# Patient Record
Sex: FEMALE | Race: WHITE | HISPANIC OR LATINO | Employment: UNEMPLOYED | URBAN - METROPOLITAN AREA
[De-identification: names, ages, dates, MRNs, and addresses within clinical notes are randomized per-mention and may not be internally consistent; named-entity substitution may affect disease eponyms.]

---

## 2021-04-29 ENCOUNTER — APPOINTMENT (OUTPATIENT)
Dept: GENERAL RADIOLOGY | Facility: CLINIC | Age: 46
End: 2021-04-29
Attending: EMERGENCY MEDICINE
Payer: OTHER GOVERNMENT

## 2021-04-29 ENCOUNTER — HOSPITAL ENCOUNTER (EMERGENCY)
Facility: CLINIC | Age: 46
Discharge: HOME | End: 2021-04-29
Attending: EMERGENCY MEDICINE | Admitting: EMERGENCY MEDICINE
Payer: OTHER GOVERNMENT

## 2021-04-29 VITALS
HEART RATE: 91 BPM | WEIGHT: 140 LBS | OXYGEN SATURATION: 92 % | TEMPERATURE: 99.1 F | RESPIRATION RATE: 16 BRPM | DIASTOLIC BLOOD PRESSURE: 78 MMHG | SYSTOLIC BLOOD PRESSURE: 129 MMHG

## 2021-04-29 DIAGNOSIS — J12.82 PNEUMONIA DUE TO 2019 NOVEL CORONAVIRUS: ICD-10-CM

## 2021-04-29 DIAGNOSIS — U07.1 PNEUMONIA DUE TO 2019 NOVEL CORONAVIRUS: ICD-10-CM

## 2021-04-29 LAB
ANION GAP SERPL CALCULATED.3IONS-SCNC: 7 MMOL/L (ref 3–14)
BASOPHILS # BLD AUTO: 0 10E9/L (ref 0–0.2)
BASOPHILS NFR BLD AUTO: 0.2 %
BUN SERPL-MCNC: 5 MG/DL (ref 7–30)
CALCIUM SERPL-MCNC: 8.9 MG/DL (ref 8.5–10.1)
CHLORIDE SERPL-SCNC: 109 MMOL/L (ref 94–109)
CO2 SERPL-SCNC: 24 MMOL/L (ref 20–32)
CREAT SERPL-MCNC: 0.58 MG/DL (ref 0.52–1.04)
DIFFERENTIAL METHOD BLD: NORMAL
EOSINOPHIL # BLD AUTO: 0 10E9/L (ref 0–0.7)
EOSINOPHIL NFR BLD AUTO: 0.2 %
ERYTHROCYTE [DISTWIDTH] IN BLOOD BY AUTOMATED COUNT: 14.4 % (ref 10–15)
GFR SERPL CREATININE-BSD FRML MDRD: >90 ML/MIN/{1.73_M2}
GLUCOSE SERPL-MCNC: 150 MG/DL (ref 70–99)
HCT VFR BLD AUTO: 42.1 % (ref 35–47)
HGB BLD-MCNC: 13.5 G/DL (ref 11.7–15.7)
IMM GRANULOCYTES # BLD: 0 10E9/L (ref 0–0.4)
IMM GRANULOCYTES NFR BLD: 0.4 %
LYMPHOCYTES # BLD AUTO: 1.6 10E9/L (ref 0.8–5.3)
LYMPHOCYTES NFR BLD AUTO: 34.8 %
MCH RBC QN AUTO: 28.4 PG (ref 26.5–33)
MCHC RBC AUTO-ENTMCNC: 32.1 G/DL (ref 31.5–36.5)
MCV RBC AUTO: 89 FL (ref 78–100)
MONOCYTES # BLD AUTO: 0.3 10E9/L (ref 0–1.3)
MONOCYTES NFR BLD AUTO: 6 %
NEUTROPHILS # BLD AUTO: 2.6 10E9/L (ref 1.6–8.3)
NEUTROPHILS NFR BLD AUTO: 58.4 %
NRBC # BLD AUTO: 0 10*3/UL
NRBC BLD AUTO-RTO: 0 /100
PLATELET # BLD AUTO: 395 10E9/L (ref 150–450)
POTASSIUM SERPL-SCNC: 3.1 MMOL/L (ref 3.4–5.3)
RBC # BLD AUTO: 4.75 10E12/L (ref 3.8–5.2)
SODIUM SERPL-SCNC: 140 MMOL/L (ref 133–144)
WBC # BLD AUTO: 4.5 10E9/L (ref 4–11)

## 2021-04-29 PROCEDURE — 99285 EMERGENCY DEPT VISIT HI MDM: CPT | Mod: 25

## 2021-04-29 PROCEDURE — 85025 COMPLETE CBC W/AUTO DIFF WBC: CPT | Performed by: EMERGENCY MEDICINE

## 2021-04-29 PROCEDURE — 80048 BASIC METABOLIC PNL TOTAL CA: CPT | Performed by: EMERGENCY MEDICINE

## 2021-04-29 PROCEDURE — 93005 ELECTROCARDIOGRAM TRACING: CPT

## 2021-04-29 PROCEDURE — 71045 X-RAY EXAM CHEST 1 VIEW: CPT

## 2021-04-29 RX ORDER — BENZONATATE 200 MG/1
200 CAPSULE ORAL 3 TIMES DAILY PRN
Qty: 12 CAPSULE | Refills: 0 | Status: SHIPPED | OUTPATIENT
Start: 2021-04-29 | End: 2023-01-16

## 2021-04-29 ASSESSMENT — ENCOUNTER SYMPTOMS
VOMITING: 0
FEVER: 0
DIARRHEA: 0
SHORTNESS OF BREATH: 1
MYALGIAS: 1
COUGH: 1

## 2021-04-29 NOTE — ED PROVIDER NOTES
History   Chief Complaint:  Shortness of Breath       HPI   Brit Lerner is a 45 year old female who presents with shortness of breath. The patient complains of worsening shortness of breath on exertion for the last four days. She states that she had a positive COVID-19 test eight days ago (04/21) after developing myalgias and cough the day before. Denies any fever, vomiting, diarrhea , chest pain, or leg swelling.      Review of Systems   Constitutional: Negative for fever.   Respiratory: Positive for cough and shortness of breath.    Cardiovascular: Negative for chest pain and leg swelling.   Gastrointestinal: Negative for diarrhea and vomiting.   Musculoskeletal: Positive for myalgias.   All other systems reviewed and are negative.      Allergies:  No known drug allergies     Medications:  The patient is not currently taking any prescribed medications.     Past Medical History:    Limited history due to access to medical records     Past Surgical History:    Limited history due to access to medical records     Social History:  Presents to emergency department alone     Physical Exam     Patient Vitals for the past 24 hrs:   BP Temp Temp src Pulse Resp SpO2 Weight   04/29/21 1445 -- -- -- -- -- 92 % --   04/29/21 1430 129/78 -- -- 91 -- 93 % --   04/29/21 1415 -- -- -- -- -- 93 % --   04/29/21 1405 -- -- -- -- -- 93 % --   04/29/21 1404 -- -- -- -- -- 92 % --   04/29/21 1403 -- -- -- -- -- 92 % --   04/29/21 1402 -- -- -- -- -- 93 % --   04/29/21 1400 120/75 -- -- 100 -- 93 % --   04/29/21 1357 (!) 182/88 99.1  F (37.3  C) Oral 101 16 95 % 63.5 kg (140 lb)       Physical Exam  Constitutional: Alert, attentive  HENT:    Nose: Nose normal.    Mouth/Throat: Oropharynx is clear, mucous membranes are moist   Eyes: EOM are normal.   CV: regular rate and rhythm; no murmurs, rubs or gallups  Chest: Effort normal and breath sounds normal.   GI:  There is no tenderness. No distension. Normal bowel sounds  MSK: Normal  range of motion.   Neurological: Alert, attentive  Skin: Skin is warm and dry.      Emergency Department Course   ECG  ECG taken at 14:24:49, ECG read at 1424  Normal sinus rhythm. Possible inferior infarct, age undetermined. Abdominal ECG.    Rate 90 bpm. AZ interval 156 ms. QRS duration 96 ms. QT/QTc 356/435 ms. P-R-T axes 37 23 10.     Imaging:  XR Chest Port 1 View  IMPRESSION: Moderately extensive interstitial and airspace infiltrates  bilaterally.  As read by Radiology.     Laboratory:  CBC: WBC: 4.5, HGB: 13.5, PLT: 395  BMP: Glucose 150 (H), Potassium: 3.1 (L), Urea Nitrogen: 5 (L), o/w WNL (Creatinine: 0.58)     Emergency Department Course:    Reviewed:  I reviewed nursing notes and vitals    Assessments:  1412 I obtained history and examined the patient as noted above.   1501 I rechecked the patient and explained findings.     Disposition:  The patient was discharged to home.       Impression & Plan     Medical Decision Making:  Brit Sanchez is a 45 year old year old female who presents for evaluation of symptoms consistent with URI vs. influenza-like vs. COVID-19 illness and worsening dyspnea. COVID-19 testing  was positive earlier during the course of illness.  X-ray shows pneumonia consistent with COVID-19.  Fortunately, the patient is not hypoxic.  She is consistently 93% on room air without respiratory distress.  Plan home oxygen monitoring with home oximeter, educated to the natural course of the illness.  She will need to return immediately for worsening shortness of breath, persistent oxygen saturations less than 90%, or any other concerns.  She is well-hydrated well-appearing, and I believe is safe for discharge this time.        Covid-19  Brit Lerner was evaluated during a global COVID-19 pandemic, which necessitated consideration that the patient might be at risk for infection with the SARS-CoV-2 virus that causes COVID-19.   Applicable protocols for evaluation were followed  during the patient's care.   COVID-19 was considered as part of the patient's evaluation. The plan for testing is:  a test was obtained at a previous visit and reviewed & considered today.    Diagnosis:    ICD-10-CM    1. Pneumonia due to 2019 novel coronavirus  U07.1 COVID-19 [facility] Loop Referral    J12.82 Care Coordination Referral       Scribe Disclosure:  I, Chelsi Mendez, am serving as a scribe at 2:04 PM on 4/29/2021 to document services personally performed by Matthew Santiago MD based on my observations and the provider's statements to me.           Matthew Santiago MD  04/29/21 1640

## 2021-04-29 NOTE — ED TRIAGE NOTES
Patient arrived by EMS from home.  Patient diagnosed with COVID 8 days ago and became sob yesterday.  95% on RA.

## 2021-04-30 ENCOUNTER — PATIENT OUTREACH (OUTPATIENT)
Dept: CARE COORDINATION | Facility: CLINIC | Age: 46
End: 2021-04-30

## 2021-04-30 LAB — INTERPRETATION ECG - MUSE: NORMAL

## 2021-04-30 NOTE — PROGRESS NOTES
Clinic Care Coordination Contact    Care Coordination Hospital/ED Discharge Follow up Note    Hospital/ED Discharge date: 4/29/21    Reason/Diagnosis for Hospital/ED visit: COVID-19    Are you feeling better, the same, or worse since your Hospital/ED visit? Better- patient denies having shortness of breath this morning and feels her cough has improved since yesterday.     Were you sent home with oxygen or a pulse oximeter? No    Are you currently utilizing the pulse oximeter and/or oxygen? NA    Were you prescribed any new medications? Yes  -- tessalon capsule  If yes, have you picked up these new medications? Yes    Do you have any questions about the new medications? No    Have you had to reduce your activities because of shortness of breath or other symptoms? Yes: but patient reports it is improving       Follow up:    Do you have any follow up appointments scheduled with your PCP or a specialist? No    Do you feel like you have a plan in place in the event of an emergency? Yes    Confirmed patient has been sent .[email or web address] via [employer] or e-mail and reviewed any questions patient may have:  N/A- patient received within ED AVS      RN Notes:    Patient called with support from  through [employer]. She reports she is feeling better this morning, wouldn't elaborate with follow up questions asked through  but continued to state she did not have any questions.     Monique Darby, CORYN, RN   [facility]  - Clinic Care Coordinator

## 2021-12-28 RX ORDER — METOPROLOL TARTRATE 25 MG/1
25 TABLET, FILM COATED ORAL 2 TIMES DAILY
Qty: 60 TABLET | Refills: 11 | OUTPATIENT
Start: 2021-12-28

## 2021-12-28 NOTE — TELEPHONE ENCOUNTER
RE    Refill Request (Metoprolol Tartrate 25 MG Tablet)    Kirsten Atkins, Telma Pierre PA-C 3 hours ago (12:27 PM)     DB    Routing refill request to provider for review/approval because:     Patient never under Provider's care.   Med not active on med list.

## 2021-12-28 NOTE — TELEPHONE ENCOUNTER
Metoprolol tartrate (Lopressor) 25 mg tablet      Take 1 tablet by mouth 2 times daily (for high blood pressure)  Last Written Prescription Date:  n/a  Last Fill Quantity: n/a,   # refills: n/a  Last Office Visit : none  Future Office visit:  none    Routing refill request to provider for review/approval because:    Patient never under Provider's care.   Med not active on med list.

## 2023-01-16 ENCOUNTER — APPOINTMENT (OUTPATIENT)
Dept: GENERAL RADIOLOGY | Facility: CLINIC | Age: 48
End: 2023-01-16
Attending: EMERGENCY MEDICINE
Payer: MEDICAID

## 2023-01-16 ENCOUNTER — APPOINTMENT (OUTPATIENT)
Dept: CT IMAGING | Facility: CLINIC | Age: 48
End: 2023-01-16
Attending: EMERGENCY MEDICINE
Payer: MEDICAID

## 2023-01-16 ENCOUNTER — HOSPITAL ENCOUNTER (INPATIENT)
Facility: CLINIC | Age: 48
LOS: 2 days | Discharge: HOME | End: 2023-01-18
Attending: EMERGENCY MEDICINE | Admitting: INTERNAL MEDICINE
Payer: MEDICAID

## 2023-01-16 ENCOUNTER — APPOINTMENT (OUTPATIENT)
Dept: ULTRASOUND IMAGING | Facility: CLINIC | Age: 48
End: 2023-01-16
Attending: EMERGENCY MEDICINE
Payer: MEDICAID

## 2023-01-16 ENCOUNTER — APPOINTMENT (OUTPATIENT)
Dept: CARDIOLOGY | Facility: CLINIC | Age: 48
End: 2023-01-16
Attending: INTERNAL MEDICINE
Payer: MEDICAID

## 2023-01-16 DIAGNOSIS — I21.4 NSTEMI (NON-ST ELEVATED MYOCARDIAL INFARCTION) (H): ICD-10-CM

## 2023-01-16 DIAGNOSIS — R93.1 ABNORMAL ECHOCARDIOGRAM: ICD-10-CM

## 2023-01-16 DIAGNOSIS — Z65.9 OTHER SOCIAL STRESSOR: ICD-10-CM

## 2023-01-16 DIAGNOSIS — I51.81 STRESS-INDUCED CARDIOMYOPATHY: Primary | ICD-10-CM

## 2023-01-16 DIAGNOSIS — I51.9 LV DYSFUNCTION: ICD-10-CM

## 2023-01-16 LAB
ALBUMIN SERPL BCG-MCNC: 4.4 G/DL (ref 3.5–5.2)
ALP SERPL-CCNC: 112 U/L (ref 35–104)
ALT SERPL W P-5'-P-CCNC: 34 U/L (ref 10–35)
ANION GAP SERPL CALCULATED.3IONS-SCNC: 13 MMOL/L (ref 7–15)
AST SERPL W P-5'-P-CCNC: 25 U/L (ref 10–35)
ATRIAL RATE - MUSE: 74 BPM
BASOPHILS # BLD AUTO: 0 10E3/UL (ref 0–0.2)
BASOPHILS NFR BLD AUTO: 0 %
BILIRUB DIRECT SERPL-MCNC: <0.2 MG/DL (ref 0–0.3)
BILIRUB SERPL-MCNC: 0.3 MG/DL
BUN SERPL-MCNC: 7.4 MG/DL (ref 6–20)
CALCIUM SERPL-MCNC: 9 MG/DL (ref 8.6–10)
CHLORIDE SERPL-SCNC: 102 MMOL/L (ref 98–107)
CREAT SERPL-MCNC: 0.42 MG/DL (ref 0.51–0.95)
D DIMER PPP FEU-MCNC: 0.52 UG/ML FEU (ref 0–0.5)
DEPRECATED HCO3 PLAS-SCNC: 23 MMOL/L (ref 22–29)
DIASTOLIC BLOOD PRESSURE - MUSE: NORMAL MMHG
EOSINOPHIL # BLD AUTO: 0 10E3/UL (ref 0–0.7)
EOSINOPHIL NFR BLD AUTO: 1 %
ERYTHROCYTE [DISTWIDTH] IN BLOOD BY AUTOMATED COUNT: 13 % (ref 10–15)
GFR SERPL CREATININE-BSD FRML MDRD: >90 ML/MIN/1.73M2
GLUCOSE SERPL-MCNC: 133 MG/DL (ref 70–99)
HCT VFR BLD AUTO: 42.2 % (ref 35–47)
HGB BLD-MCNC: 13.7 G/DL (ref 11.7–15.7)
IMM GRANULOCYTES # BLD: 0 10E3/UL
IMM GRANULOCYTES NFR BLD: 0 %
INTERPRETATION ECG - MUSE: NORMAL
LIPASE SERPL-CCNC: 29 U/L (ref 13–60)
LVEF ECHO: NORMAL
LYMPHOCYTES # BLD AUTO: 1.4 10E3/UL (ref 0.8–5.3)
LYMPHOCYTES NFR BLD AUTO: 17 %
MCH RBC QN AUTO: 30 PG (ref 26.5–33)
MCHC RBC AUTO-ENTMCNC: 32.5 G/DL (ref 31.5–36.5)
MCV RBC AUTO: 93 FL (ref 78–100)
MONOCYTES # BLD AUTO: 0.3 10E3/UL (ref 0–1.3)
MONOCYTES NFR BLD AUTO: 4 %
NEUTROPHILS # BLD AUTO: 6.2 10E3/UL (ref 1.6–8.3)
NEUTROPHILS NFR BLD AUTO: 78 %
NRBC # BLD AUTO: 0 10E3/UL
NRBC BLD AUTO-RTO: 0 /100
P AXIS - MUSE: 55 DEGREES
PLATELET # BLD AUTO: 320 10E3/UL (ref 150–450)
POTASSIUM SERPL-SCNC: 3.8 MMOL/L (ref 3.4–5.3)
PR INTERVAL - MUSE: 164 MS
PROT SERPL-MCNC: 7.9 G/DL (ref 6.4–8.3)
QRS DURATION - MUSE: 92 MS
QT - MUSE: 408 MS
QTC - MUSE: 452 MS
R AXIS - MUSE: 80 DEGREES
RBC # BLD AUTO: 4.56 10E6/UL (ref 3.8–5.2)
SODIUM SERPL-SCNC: 138 MMOL/L (ref 136–145)
SYSTOLIC BLOOD PRESSURE - MUSE: NORMAL MMHG
T AXIS - MUSE: 64 DEGREES
TROPONIN T SERPL HS-MCNC: 34 NG/L
TROPONIN T SERPL HS-MCNC: 42 NG/L
TROPONIN T SERPL HS-MCNC: 51 NG/L
VENTRICULAR RATE- MUSE: 74 BPM
WBC # BLD AUTO: 8 10E3/UL (ref 4–11)

## 2023-01-16 PROCEDURE — 250N000011 HC RX IP 250 OP 636: Performed by: EMERGENCY MEDICINE

## 2023-01-16 PROCEDURE — 250N000013 HC RX MED GY IP 250 OP 250 PS 637: Performed by: EMERGENCY MEDICINE

## 2023-01-16 PROCEDURE — 93325 DOPPLER ECHO COLOR FLOW MAPG: CPT

## 2023-01-16 PROCEDURE — 93325 DOPPLER ECHO COLOR FLOW MAPG: CPT | Mod: 26 | Performed by: INTERNAL MEDICINE

## 2023-01-16 PROCEDURE — 71046 X-RAY EXAM CHEST 2 VIEWS: CPT

## 2023-01-16 PROCEDURE — 71275 CT ANGIOGRAPHY CHEST: CPT

## 2023-01-16 PROCEDURE — 85379 FIBRIN DEGRADATION QUANT: CPT | Performed by: EMERGENCY MEDICINE

## 2023-01-16 PROCEDURE — 255N000002 HC RX 255 OP 636: Performed by: INTERNAL MEDICINE

## 2023-01-16 PROCEDURE — 120N000001 HC R&B MED SURG/OB

## 2023-01-16 PROCEDURE — 93321 DOPPLER ECHO F-UP/LMTD STD: CPT | Mod: 26 | Performed by: INTERNAL MEDICINE

## 2023-01-16 PROCEDURE — 250N000011 HC RX IP 250 OP 636: Performed by: INTERNAL MEDICINE

## 2023-01-16 PROCEDURE — 99222 1ST HOSP IP/OBS MODERATE 55: CPT | Performed by: INTERNAL MEDICINE

## 2023-01-16 PROCEDURE — C8924 2D TTE W OR W/O FOL W/CON,FU: HCPCS

## 2023-01-16 PROCEDURE — 84484 ASSAY OF TROPONIN QUANT: CPT | Performed by: INTERNAL MEDICINE

## 2023-01-16 PROCEDURE — 99221 1ST HOSP IP/OBS SF/LOW 40: CPT | Performed by: SURGERY

## 2023-01-16 PROCEDURE — 83690 ASSAY OF LIPASE: CPT | Performed by: EMERGENCY MEDICINE

## 2023-01-16 PROCEDURE — 99223 1ST HOSP IP/OBS HIGH 75: CPT | Mod: AI | Performed by: INTERNAL MEDICINE

## 2023-01-16 PROCEDURE — 85025 COMPLETE CBC W/AUTO DIFF WBC: CPT | Performed by: EMERGENCY MEDICINE

## 2023-01-16 PROCEDURE — 99207 PR SC NO CHARGE VISIT: CPT | Performed by: INTERNAL MEDICINE

## 2023-01-16 PROCEDURE — 82248 BILIRUBIN DIRECT: CPT | Performed by: EMERGENCY MEDICINE

## 2023-01-16 PROCEDURE — 80048 BASIC METABOLIC PNL TOTAL CA: CPT | Performed by: NURSE PRACTITIONER

## 2023-01-16 PROCEDURE — 84484 ASSAY OF TROPONIN QUANT: CPT | Performed by: EMERGENCY MEDICINE

## 2023-01-16 PROCEDURE — 93005 ELECTROCARDIOGRAM TRACING: CPT

## 2023-01-16 PROCEDURE — 250N000013 HC RX MED GY IP 250 OP 250 PS 637: Performed by: INTERNAL MEDICINE

## 2023-01-16 PROCEDURE — 80053 COMPREHEN METABOLIC PANEL: CPT | Performed by: EMERGENCY MEDICINE

## 2023-01-16 PROCEDURE — 36415 COLL VENOUS BLD VENIPUNCTURE: CPT | Performed by: EMERGENCY MEDICINE

## 2023-01-16 PROCEDURE — 258N000003 HC RX IP 258 OP 636: Performed by: EMERGENCY MEDICINE

## 2023-01-16 PROCEDURE — 93308 TTE F-UP OR LMTD: CPT | Mod: 26 | Performed by: INTERNAL MEDICINE

## 2023-01-16 PROCEDURE — 76705 ECHO EXAM OF ABDOMEN: CPT

## 2023-01-16 PROCEDURE — 36415 COLL VENOUS BLD VENIPUNCTURE: CPT | Performed by: INTERNAL MEDICINE

## 2023-01-16 PROCEDURE — 84702 CHORIONIC GONADOTROPIN TEST: CPT | Performed by: INTERNAL MEDICINE

## 2023-01-16 PROCEDURE — 99285 EMERGENCY DEPT VISIT HI MDM: CPT | Mod: 25

## 2023-01-16 RX ORDER — HYDROMORPHONE HYDROCHLORIDE 1 MG/ML
0.3 INJECTION, SOLUTION INTRAMUSCULAR; INTRAVENOUS; SUBCUTANEOUS ONCE
Status: COMPLETED | OUTPATIENT
Start: 2023-01-16 | End: 2023-01-16

## 2023-01-16 RX ORDER — ACETAMINOPHEN 500 MG
1000 TABLET ORAL EVERY 8 HOURS
Status: DISCONTINUED | OUTPATIENT
Start: 2023-01-16 | End: 2023-01-18 | Stop reason: HOSPADM

## 2023-01-16 RX ORDER — POTASSIUM CHLORIDE 1500 MG/1
20 TABLET, EXTENDED RELEASE ORAL
Status: COMPLETED | OUTPATIENT
Start: 2023-01-16 | End: 2023-01-17

## 2023-01-16 RX ORDER — LIDOCAINE 40 MG/G
CREAM TOPICAL
Status: DISCONTINUED | OUTPATIENT
Start: 2023-01-16 | End: 2023-01-18 | Stop reason: HOSPADM

## 2023-01-16 RX ORDER — HEPARIN SODIUM 10000 [USP'U]/100ML
0-5000 INJECTION, SOLUTION INTRAVENOUS CONTINUOUS
Status: DISCONTINUED | OUTPATIENT
Start: 2023-01-16 | End: 2023-01-16

## 2023-01-16 RX ORDER — NICOTINE POLACRILEX 4 MG
15-30 LOZENGE BUCCAL
Status: DISCONTINUED | OUTPATIENT
Start: 2023-01-16 | End: 2023-01-18 | Stop reason: HOSPADM

## 2023-01-16 RX ORDER — ASPIRIN 81 MG/1
243 TABLET, CHEWABLE ORAL ONCE
Status: DISCONTINUED | OUTPATIENT
Start: 2023-01-16 | End: 2023-01-16

## 2023-01-16 RX ORDER — HYDROMORPHONE HYDROCHLORIDE 1 MG/ML
0.3 INJECTION, SOLUTION INTRAMUSCULAR; INTRAVENOUS; SUBCUTANEOUS
Status: DISCONTINUED | OUTPATIENT
Start: 2023-01-16 | End: 2023-01-18 | Stop reason: HOSPADM

## 2023-01-16 RX ORDER — IOPAMIDOL 755 MG/ML
500 INJECTION, SOLUTION INTRAVASCULAR ONCE
Status: COMPLETED | OUTPATIENT
Start: 2023-01-16 | End: 2023-01-16

## 2023-01-16 RX ORDER — DEXTROSE MONOHYDRATE 25 G/50ML
25-50 INJECTION, SOLUTION INTRAVENOUS
Status: DISCONTINUED | OUTPATIENT
Start: 2023-01-16 | End: 2023-01-18 | Stop reason: HOSPADM

## 2023-01-16 RX ORDER — ENOXAPARIN SODIUM 100 MG/ML
40 INJECTION SUBCUTANEOUS EVERY 24 HOURS
Status: DISCONTINUED | OUTPATIENT
Start: 2023-01-16 | End: 2023-01-18 | Stop reason: HOSPADM

## 2023-01-16 RX ORDER — ASPIRIN 325 MG
325 TABLET ORAL ONCE
Status: COMPLETED | OUTPATIENT
Start: 2023-01-16 | End: 2023-01-16

## 2023-01-16 RX ORDER — SODIUM CHLORIDE 9 MG/ML
INJECTION, SOLUTION INTRAVENOUS CONTINUOUS
Status: DISCONTINUED | OUTPATIENT
Start: 2023-01-16 | End: 2023-01-16

## 2023-01-16 RX ORDER — CARBOXYMETHYLCELLULOSE SODIUM 10 MG/ML
1 GEL OPHTHALMIC 4 TIMES DAILY
COMMUNITY
End: 2023-03-14

## 2023-01-16 RX ORDER — NITROGLYCERIN 0.4 MG/1
TABLET SUBLINGUAL
Status: DISCONTINUED
Start: 2023-01-16 | End: 2023-01-16 | Stop reason: HOSPADM

## 2023-01-16 RX ORDER — ASPIRIN 325 MG
325 TABLET ORAL ONCE
Status: DISCONTINUED | OUTPATIENT
Start: 2023-01-16 | End: 2023-01-16

## 2023-01-16 RX ORDER — LISINOPRIL AND HYDROCHLOROTHIAZIDE 12.5; 2 MG/1; MG/1
1 TABLET ORAL DAILY
Status: ON HOLD | COMMUNITY
End: 2023-01-18

## 2023-01-16 RX ADMIN — HUMAN ALBUMIN MICROSPHERES AND PERFLUTREN 3 ML: 10; .22 INJECTION, SOLUTION INTRAVENOUS at 13:40

## 2023-01-16 RX ADMIN — SODIUM CHLORIDE 85 ML: 9 INJECTION, SOLUTION INTRAVENOUS at 07:47

## 2023-01-16 RX ADMIN — ACETAMINOPHEN 1000 MG: 500 TABLET ORAL at 20:11

## 2023-01-16 RX ADMIN — HYDROMORPHONE HYDROCHLORIDE 0.3 MG: 1 INJECTION, SOLUTION INTRAMUSCULAR; INTRAVENOUS; SUBCUTANEOUS at 14:17

## 2023-01-16 RX ADMIN — ENOXAPARIN SODIUM 40 MG: 40 INJECTION SUBCUTANEOUS at 20:12

## 2023-01-16 RX ADMIN — ASPIRIN 325 MG ORAL TABLET 325 MG: 325 PILL ORAL at 06:37

## 2023-01-16 RX ADMIN — HEPARIN SODIUM 800 UNITS/HR: 10000 INJECTION, SOLUTION INTRAVENOUS at 07:23

## 2023-01-16 RX ADMIN — IOPAMIDOL 63 ML: 755 INJECTION, SOLUTION INTRAVENOUS at 07:47

## 2023-01-16 ASSESSMENT — ACTIVITIES OF DAILY LIVING (ADL)
ADLS_ACUITY_SCORE: 35
ADLS_ACUITY_SCORE: 18
ADLS_ACUITY_SCORE: 35
ADLS_ACUITY_SCORE: 18
ADLS_ACUITY_SCORE: 18
ADLS_ACUITY_SCORE: 35

## 2023-01-16 ASSESSMENT — ENCOUNTER SYMPTOMS
SHORTNESS OF BREATH: 0
FEVER: 0

## 2023-01-16 NOTE — ED NOTES
[facility]  ED Nurse Handoff Report    Brit Sanchez is a 47 year old female   ED Chief complaint: Chest Pain  . ED Diagnosis:   Final diagnoses:   NSTEMI (non-ST elevated myocardial infarction) (H)   Other social stressor     Allergies: No Known Allergies    Code Status: Full Code  Activity level - Baseline/Home:  Independent. Activity Level - Current:   Stand by Assist. Lift room needed: No. Bariatric: No   Needed: No   Isolation: No. Infection: Not Applicable.     Vital Signs:   Vitals:    01/16/23 0926 01/16/23 0941 01/16/23 0956 01/16/23 1011   BP: 120/77 116/76 126/78 121/79   Pulse: 66 65 70 63   Resp:       Temp:       TempSrc:       SpO2: 95% 96% 95% 96%   Weight:           Cardiac Rhythm:  ,      Pain level:    Patient confused: No. Patient Falls Risk: Yes.   Elimination Status: Has voided   Patient Report - Initial Complaint:  0512 ED Triage Notes Addendum ED Triage Notes Addendum CP          ?Pt arrives to ED with one day of R sided CP that goes from under her R breast and around to her back. Pt states this began around noon, tried ibu around 1300 without relief. Denies medical hx or trauma. Denies n/v/d.          Focused Assessment:    0711 Cardiac  Cardiac  KN    Cardiac (Adult) Cardiac WDL: .WDL except; chest pain   Chest Pain Assessment Chest Pain Location: anterior chest, right  Chest Pain Radiation: back  Precipitating Factors: emotional stress  Associated Signs/Symptoms: other (see comments)  (face/eye twitching)  Chest Pain Intervention: 12-lead ECG obtained; cardiac monitor placed                Tests Performed: labs, imaging, heparin   Abnormal Results:   US Abdomen Limited (RUQ)   Final Result   IMPRESSION: Cholelithiasis without cholecystitis.       RENEA YATES MD            SYSTEM ID:  [id number]      CT Chest Pulmonary Embolism w Contrast   Final Result   IMPRESSION:   1.  No pulmonary embolism demonstrated.   2.  Question some minimal groundglass  infiltrates in both lower lobes   vs. motion artifact.      RENAE YATES MD            SYSTEM ID:  [id number]      Chest XR,  PA & LAT   Final Result   IMPRESSION: Negative chest.      Echocardiogram Exercise Stress    (Results Pending)     Labs Ordered and Resulted from Time of ED Arrival to Time of ED Departure   BASIC METABOLIC PANEL - Abnormal       Result Value    Sodium 138      Potassium 3.8      Chloride 102      Carbon Dioxide (CO2) 23      Anion Gap 13      Urea Nitrogen 7.4      Creatinine 0.42 (*)     Calcium 9.0      Glucose 133 (*)     GFR Estimate >90     TROPONIN T, HIGH SENSITIVITY - Abnormal    Troponin T, High Sensitivity 51 (*)    HEPATIC FUNCTION PANEL - Abnormal    Protein Total 7.9      Albumin 4.4      Bilirubin Total 0.3      Alkaline Phosphatase 112 (*)     AST 25      ALT 34      Bilirubin Direct <0.20     D DIMER QUANTITATIVE - Abnormal    D-Dimer Quantitative 0.52 (*)    TROPONIN T, HIGH SENSITIVITY - Abnormal    Troponin T, High Sensitivity 42 (*)    LIPASE - Normal    Lipase 29     CBC WITH PLATELETS AND DIFFERENTIAL    WBC Count 8.0      RBC Count 4.56      Hemoglobin 13.7      Hematocrit 42.2      MCV 93      MCH 30.0      MCHC 32.5      RDW 13.0      Platelet Count 320      % Neutrophils 78      % Lymphocytes 17      % Monocytes 4      % Eosinophils 1      % Basophils 0      % Immature Granulocytes 0      NRBCs per 100 WBC 0      Absolute Neutrophils 6.2      Absolute Lymphocytes 1.4      Absolute Monocytes 0.3      Absolute Eosinophils 0.0      Absolute Basophils 0.0      Absolute Immature Granulocytes 0.0      Absolute NRBCs 0.0       Treatments provided: heparin, aspirin  Family Comments:  present  OBS brochure/video discussed/provided to patient:  N/A  ED Medications:   Medications   aspirin (ASA) tablet 325 mg (325 mg Oral Given 1/16/23 0637)   heparin loading dose for LOW INTENSITY TREATMENT * Give BEFORE starting heparin infusion (4,000 Units Intravenous Given 1/16/23  0723)   0.9% sodium chloride BOLUS (85 mLs Intravenous New Bag 1/16/23 0747)   iopamidol (ISOVUE-370) solution 500 mL (63 mLs Intravenous Given 1/16/23 0747)     Drips infusing:  No  For the majority of the shift, the patient's behavior Green. Interventions performed were N/A.    Sepsis treatment initiated: No     Patient tested for COVID 19 prior to admission: NO    ED Nurse Name/Phone Number: Katey Chen RN,   10:58 AM    RECEIVING UNIT ED HANDOFF REVIEW    Above ED Nurse Handoff Report was reviewed: Yes  Reviewed by: Nakita Espinoza, JONAS on January 16, 2023 at 4:40 PM

## 2023-01-16 NOTE — PROGRESS NOTES
[facility]  Hospitalist Progress Note  Dewayne Ricardo M.D., M.B.A.   01/16/2023    Reason for Stay/active problem list      Severe right-sided chest pain    Elevated troponin         Assessment and Plan:        Summary of Stay: Brit a 47-year-old female who presents with right-sided chest pain radiating to her right shoulder.  She had elevated troponin as well and started on a heparin drip due to concern for non-ST segment elevation MI.  Patient was closely monitored in the ER and treated with pain medications.  Cardiology team was consulted to assist with further recommendations.    Problem List with Assessment and Plan:      1. Right-sided chest pain: Atypical chest pain for acute cardiac ischemia.  EKG showed no evidence of ischemic EKG pattern.  Patient was empirically treated with heparin infusion in the emergency department.    Etiology of her chest pain is not entirely clear.  Differential diagnosis include biliary pain versus musculoskeletal versus others.    Cardiology was consulted and recommendation obtained.    Patient was sent for stress testing with a treadmill stress echo.  Unfortunately patient developed severe pain and resting wall motion abnormality on echo limiting the study.    Currently complaining of 9 out of 10 right-sided chest pain.  Will treat with Dilaudid IV 0.3 mg every 2 hours as needed.  We will get further recommendations from cardiology.  We will request some respiratory consultation for evaluation of possible biliary pain      2.  Elevated  troponin:      High-sensitivity troponin T was elevated at 51 on presentation.  Trended down to 42 after 4 hours.    We will get another troponin level in a few hours, monitor on telemetry, cardiology following.        3. Cholelithiasis      Evidence of cholelithiasis and ultrasonographic exam without evidence of acute cholecystitis.    We will get general surgery consult to assist with further recommendations and x-rays  if pain is related to gallbladder disease.        Plan for today:    Pain control    Further cardiology input    General surgery consult    Telemetry monitoring    Addendum  -- Patient was seen by cardiology team again and recommendation noted.  Coronary angiogram planned for tomorrow.  Cardiology input appreciated.    VTE Prophylaxis: Enoxaparin (Lovenox) SQ  Code Status: Full Code  Diet: NPO per Anesthesia Guidelines for Procedure/Surgery Except for: Meds    Villegas Catheter: Not present    Family updated today: Yes      Disposition: May discharge in the next 2 days pending resolution of chest pain          Interval History (Subjective):        Patient is seen and examined by me today and medical record reviewed.Overnight events noted and care discussed with nursing staff.  Patient is Telugu only speaking and iPad  was used for the encounter.  Patient continues to complain of right-sided chest pain which is not related to her breathing.  Her pain is moderate to severe radiating to the right shoulder area.  She denies any fever or chills.  No shortness of breath.                  Physical Exam:        Last Vital Signs:  /79   Pulse 63   Temp 97.3  F (36.3  C)   Resp 21   Wt 66.9 kg (147 lb 6.4 oz)   SpO2 96%     No intake/output data recorded.    Wt Readings from Last 5 Encounters:   01/16/23 66.9 kg (147 lb 6.4 oz)   04/29/21 63.5 kg (140 lb)        Constitutional: Awake, alert, cooperative, no apparent distress     Respiratory: Clear to auscultation bilaterally, no crackles or wheezing   Cardiovascular: Regular rate and rhythm, normal S1 and S2, and no murmur noted   Abdomen: Normal bowel sounds, soft, non-distended, non-tender   Skin: No new rashes, no cyanosis, dry to touch   Neuro: Alert with  no new focal weakness   Extremities: No edema   Other(s):        All other systems: Negative          Medications:        All current medications were reviewed with changes reflected in problem  list.         Data:      All new lab and imaging data was reviewed.      Data reviewed today: I reviewed all new labs and imaging results over the last 24 hours. I personally reviewed       Recent Labs   Lab 01/16/23  0532   WBC 8.0   HGB 13.7   HCT 42.2   MCV 93        No results for input(s): CULT in the last 168 hours.  Recent Labs   Lab 01/16/23  0532      POTASSIUM 3.8   CHLORIDE 102   CO2 23   ANIONGAP 13   *   BUN 7.4   CR 0.42*   GFRESTIMATED >90   ANA 9.0   PROTTOTAL 7.9   ALBUMIN 4.4   BILITOTAL 0.3   ALKPHOS 112*   AST 25   ALT 34       Recent Labs   Lab 01/16/23  0532   *       No results for input(s): INR in the last 168 hours.      No results for input(s): TROPONIN, TROPI, TROPR in the last 168 hours.    Invalid input(s): TROP, TROPONINIES    Recent Results (from the past 48 hour(s))   Chest XR,  PA & LAT    Narrative    EXAM: XR CHEST 2 VIEWS  LOCATION: [facility]  DATE/TIME: 1/16/2023 6:26 AM    INDICATION: chest pain  COMPARISON: Portable chest radiograph 04/29/2021      Impression    IMPRESSION: Negative chest.   CT Chest Pulmonary Embolism w Contrast    Narrative    CT CHEST PULMONARY EMBOLISM WITH CONTRAST 1/16/2023 8:02 AM    CLINICAL HISTORY: Chest pain.  Not pregnant.  No imaging to rule out  PE in the last 24 hours. Pulmonary Embolism Rule-Out Criteria (PERC)  score not > 0.    TECHNIQUE: CT angiogram chest during arterial phase injection IV  contrast. 2D and 3D MIP reconstructions were performed by the CT  technologist. Dose reduction techniques were used.     CONTRAST: 63 mL Isovue-370    COMPARISON: None.    FINDINGS:  ANGIOGRAM CHEST: Pulmonary arteries are normal caliber and negative  for pulmonary emboli. Thoracic aorta is negative for dissection. No CT  evidence of right heart strain.    LUNGS AND PLEURA: Question some groundglass infiltrates in both lower  lobes vs. motion artifact. No effusions.    MEDIASTINUM/AXILLAE: No adenopathy  or aneurysm.    CORONARY ARTERY CALCIFICATIONS: None.    UPPER ABDOMEN: No acute findings.    MUSCULOSKELETAL: No frankly destructive bony lesions.      Impression    IMPRESSION:  1.  No pulmonary embolism demonstrated.  2.  Question some minimal groundglass infiltrates in both lower lobes  vs. motion artifact.    RENAE YATES MD         SYSTEM ID:  [id number]   US Abdomen Limited (RUQ)    Narrative    US ABDOMEN LIMITED 2023 8:59 AM    CLINICAL HISTORY: Abdominal pain.  TECHNIQUE: Limited abdominal ultrasound.    COMPARISON: None.    FINDINGS:    GALLBLADDER: Cholelithiasis without cholecystitis.    BILE DUCTS: There is no biliary dilatation. The common duct measures 3  mm.    LIVER: Mild increased echotexture likely related to mild fatty  infiltration, no focal lesions.    RIGHT KIDNEY: No hydronephrosis.    PANCREAS: The visualized portions of the pancreas are normal.    No ascites.      Impression    IMPRESSION: Cholelithiasis without cholecystitis.     RENAE YATES MD         SYSTEM ID:  [id number]   Echo Limited   Result Value    LVEF  45-50%    Narrative    [id number]  [id number]  [id number]  [id number]^VINICIO^WHITNEY^MARYURI SANCHEZ     [facility]  Echocardiography Laboratory  [address] [place] [address]     Name: BLANKA ROB  MRN: [id number]  : 1975  Study Date: 2023 01:13 PM  Age: 47 yrs  Gender: Female  Patient Location: [facility]  Reason For Study: Chest Pain  Ordering Physician: WHITNEY MOONEY  Performed By: Calista Latif     BSA: 1.6 m2  Height: 60 in  Weight: 147 lb  BP: 110/80 mmHg  ______________________________________________________________________________  Procedure  Limited Echo Adult. Optison (NDC #0695-1269) given intravenously.  ______________________________________________________________________________  Interpretation Summary     A limited study was performed  There is lateral wall dyskinesis.  Left ventricular systolic function is mildly reduced.  The  "right ventricle is normal in structure, function and size.  Doppler interrogation does not demonstrate signficant stenosis or  insufficiency involving cardiac valves.     There is a focal anterolateral region of dyskinesis. The pattern of wall  motion abnormality is not typical for myocardial ischemia as the apex is  spared. The differential diagnosis includes \"Takotsubo/Stress\" cardiomyopathy  or lateral wall ischemic event from ramus/ diagonal coronary artery disease.  ______________________________________________________________________________  Left Ventricle  The left ventricle is normal in size. There is normal left ventricular wall  thickness. Left ventricular systolic function is mildly reduced. The visual  ejection fraction is 45-50%. Left ventricular diastolic function is  indeterminate. There is lateral wall dyskinesis. There is no thrombus seen in  the left ventricle.     Right Ventricle  The right ventricle is normal in structure, function and size. There is no  mass or thrombus in the right ventricle.     Atria  Normal left atrial size. The left atrial appendage is not well visualized.     Mitral Valve  The mitral valve leaflets appear normal. There is no evidence of stenosis,  fluttering, or prolapse. There is no mitral regurgitation noted. There is no  mitral valve stenosis.     Tricuspid Valve  Normal tricuspid valve. No tricuspid regurgitation. There is no tricuspid  stenosis.     Aortic Valve  The aortic valve is trileaflet. No aortic regurgitation is present. No aortic  stenosis is present.     Pulmonic Valve  The pulmonic valve is not well seen, but is grossly normal. There is no  pulmonic valvular regurgitation. There is no pulmonic valvular stenosis.     Vessels  The aortic root is normal size. Normal size ascending aorta. Inferior vena  cava not well visualized for estimation of right atrial pressure.     Pericardium  The pericardium appears normal. There is no pleural effusion.   "   Rhythm  Sinus rhythm was noted.  ______________________________________________________________________________  MMode/2D Measurements & Calculations  asc Aorta Diam: 3.5 cm     ______________________________________________________________________________  Report approved by: Dr. Sandro Blackburn 01/16/2023 02:08 PM             COVID Status:  COVID-19 PCR Results    COVID-19 PCR Results 1/7/22 10/5/22   COVID-19 Virus by PCR (External Result) Not Detected Not Detected      Comments are available for some flowsheets but are not being displayed.         COVID-19 Antibody Results, Testing for Immunity    COVID-19 Antibody Results, Testing for Immunity   No data to display.              Disclaimer: This note consists of symbols derived from keyboarding, dictation and/or voice recognition software. As a result, there may be errors in the script that have gone undetected. Please consider this when interpreting information found in this chart.

## 2023-01-16 NOTE — ED PROVIDER NOTES
History     Chief Complaint:  Chest Pain     The history is provided by the patient. A  was used (Dominican).      Brit Sanchez is a 47 year old female who presents with right sided chest pain. The patient provides that yesterday around 1200, she developed a right sided chest pain just inferior to her breast and around her back. This pain has been constant since then. She denies having any shortness of breath or fever with this. No cardiac history and she has no daily medications. It was noted that she recently found out a family member passed away.    Independent Historian & Review of External Notes: I reviewed with: Care Everywhere.     ROS:  Review of Systems   Constitutional: Negative for fever.   Respiratory: Negative for shortness of breath.    Cardiovascular: Positive for chest pain (right).   All other systems reviewed and are negative.    Allergies:  The patient has no known allergies to medications.     Medications:    lisinopril-hydrochlorothiazide (ZESTORETIC) 20-12.5 MG tablet      Past Medical History:   Diagnosis Date     Hypertension      Kidney stone      Past Surgical History:   Procedure Laterality Date     HYSTEROSCOPY DIAGNOSTIC       Tubal ligation NOS          Family History:    The patient denies any prior family history.    Social History:  Patient came from home.  Patient is accompanied in the ED.  PCP: [facility]     Physical Exam     Patient Vitals for the past 24 hrs:   BP Temp Temp src Pulse Resp SpO2 Weight   01/16/23 0643 -- -- -- -- -- -- 66.9 kg (147 lb 6.4 oz)   01/16/23 0600 -- -- -- 64 16 96 % --   01/16/23 0545 -- -- -- 71 13 97 % --   01/16/23 0512 137/76 97.3  F (36.3  C) -- -- -- -- --   01/16/23 0511 -- -- Temporal 67 20 97 % --        Physical Exam  Nursing note and vitals reviewed.  Constitutional: Cooperative. Tearful.   HENT:   Mouth/Throat: Mucous membranes are normal.   Cardiovascular: Normal rate, regular  rhythm and normal heart sounds.  No murmur.  Pulmonary/Chest: Effort normal and breath sounds normal. No respiratory distress. No wheezes. No rales.   Abdominal: Soft. Normal appearance. There is no tenderness.    Musculoskeletal: No LE edema  Neurological: Alert. Oriented x4  Skin: Skin is warm and dry.   Psychiatric: Normal mood and affect.     Emergency Department Course     ECG  ECG taken at 0520, ECG read at 0522  Normal sinus rhythm with sinus arrhythmia  Normal ECG   Rate 74 bpm. AR interval 164 ms. QRS duration 92 ms. QT/QTc 408/452 ms. P-R-T axes 55 80 64.     Imaging:  Chest XR,  PA & LAT   Final Result   IMPRESSION: Negative chest.      Report per radiology    Laboratory:  Labs Ordered and Resulted from Time of ED Arrival to Time of ED Departure   BASIC METABOLIC PANEL - Abnormal       Result Value    Sodium 138      Potassium 3.8      Chloride 102      Carbon Dioxide (CO2) 23      Anion Gap 13      Urea Nitrogen 7.4      Creatinine 0.42 (*)     Calcium 9.0      Glucose 133 (*)     GFR Estimate >90     TROPONIN T, HIGH SENSITIVITY - Abnormal    Troponin T, High Sensitivity 51 (*)    HEPATIC FUNCTION PANEL - Abnormal    Protein Total 7.9      Albumin 4.4      Bilirubin Total 0.3      Alkaline Phosphatase 112 (*)     AST 25      ALT 34      Bilirubin Direct <0.20     LIPASE - Normal    Lipase 29     CBC WITH PLATELETS AND DIFFERENTIAL    WBC Count 8.0      RBC Count 4.56      Hemoglobin 13.7      Hematocrit 42.2      MCV 93      MCH 30.0      MCHC 32.5      RDW 13.0      Platelet Count 320      % Neutrophils 78      % Lymphocytes 17      % Monocytes 4      % Eosinophils 1      % Basophils 0      % Immature Granulocytes 0      NRBCs per 100 WBC 0      Absolute Neutrophils 6.2      Absolute Lymphocytes 1.4      Absolute Monocytes 0.3      Absolute Eosinophils 0.0      Absolute Basophils 0.0      Absolute Immature Granulocytes 0.0      Absolute NRBCs 0.0     D DIMER QUANTITATIVE - pending          Interventions:  Medications   aspirin (ASA) tablet 325 mg (has no administration in time range)   heparin loading dose for LOW INTENSITY TREATMENT * Give BEFORE starting heparin infusion (has no administration in time range)   heparin 25,000 units in 0.45% NaCl 250 mL ANTICOAGULANT infusion (has no administration in time range)      Independent Interpretation (X-rays, CTs, rhythm strip):  I independently reviewed ECG and xray.    Consultations/Discussion of Management or Tests:  0533 I obtained history and examined the patient as noted above.  0614 I rechecked the patient and explained findings.  0630 I consulted with Dr. Buck of the hospitalist service and discussed patient admission. They accepted care of the patient.    Disposition:  The patient was admitted to the hospital under the care of Dr. Buck.     Impression & Plan      Medical Decision Making:  Brit Sanchez is a 47 year old female who undergoing significant stress following the loss of a family member who presents with right sided chest pain since yesterday. ECG is nonischemic but unfortunately her troponin is elevated, concerning for a NSTEMI. This could be a case of stress cardiomyopathy. No indication for cath lab activation at this time. We will start heparin and administer aspirin PO. I will add a dimer to her labs to insure there is no evidence of PE, although I clinically doubt this. Her abdominal exam is normal with no tenderness to the RUQ. She will be admitted for medical management, serial troponins, echocardiogram, and consideration of cardiology consultation.    Diagnosis:    ICD-10-CM    1. NSTEMI (non-ST elevated myocardial infarction) (H)  I21.4       2. Other social stressor  Z65.9            Scribe Disclosure:  I, Pablo Kumari, am serving as a scribe at 5:29 AM on 1/16/2023 to document services personally performed by Matthew Kerr MD based on my observations and the provider's statements to me.        Matthew Kerr,  MD  01/16/23 0702

## 2023-01-16 NOTE — PHARMACY-ADMISSION MEDICATION HISTORY
Admission medication history interview status for this patient is complete. See [facility] admission navigator for allergy information, prior to admission medications and immunization status.     Medication history interview done, indicate source(s): Patient and surescripts,   Medication history resources (including written lists, pill bottles, clinic record):surescripts, pt  Pharmacy: TBD    Changes made to PTA medication list:  Added: eye drops  Changed: none  Reported as Not Taking: none  Removed: none    Actions taken by pharmacist (provider contacted, etc):spoke to the pt via      Additional medication history information:pt reports to have run out of BP meds 3 days or so ago. She mentioned that she requested it from the pharmacy but haven't heard back yet. Pt also reports using drops for dry eyes. No other meds or supplements are reported at this time.    Medication reconciliation/reorder completed by provider prior to medication history?  NO      Prior to Admission medications    Medication Sig Last Dose Taking? Auth Provider Long Term End Date   carboxymethylcellulose PF (REFRESH LIQUIGEL) 1 % ophthalmic gel Place 1 drop into both eyes 4 times daily Past Week at na Yes Unknown, Entered By History     lisinopril-hydrochlorothiazide (ZESTORETIC) 20-12.5 MG tablet Take 1 tablet by mouth daily Past Week at na Yes Reported, Patient Yes      \

## 2023-01-16 NOTE — PROGRESS NOTES
Patient seen and examined by me today .Medical records reviewed and plan of care from the previous attending physician  earlier today was discussed with the patient.  I also spoke with Dr Buck.  Briefly patient is 46 y/o Slovak only speaking female who came in with atypical chest pain.    Assessment     Chest pain , atypical right sided , no indication of cardiac ischemia despite trop bump. Seen by cardiology and input appreciated     Elevated troponin - trending down    Cholelithiasis     Plan     Stop heparin     Stress echo     May need lap ximena if pain persists

## 2023-01-16 NOTE — CONSULTS
General Surgery Consultation    Brit Sanchez MRN# [id number]   Age: 47 year old YOB: 1975     Date of Admission:  1/16/2023    Reason for consult:            Right sided pain       Requesting physician:            MD Davion                Assessment and Plan:   Assessment:   Brit Sanchez is a 47 year old female with right sided pain and found cholelithiasis, cardiac workup with elevated troponin, reduced LV systolic function, and concern for cardiomyopathy vs lateral wall ischemia.     Comorbidities:   has a past medical history of Hypertension and Kidney stone.      Plan:   I spoke with Dr. Ricardo with the hospitalist service. Although patient has cholelithiasis on US, other US findings are not consistent with acute gallbladder issue. Patient physical exam with pain on the right lateral chest wall and no pain under the right rib cage and negative Way sign would support that the pain is not gallbladder related.  At this time with current cardiac workup going on I would not recommend cholecystectomy. Surgery to sign off please call with questions/concerns.                  Chief Complaint:   Right sided pain     History is obtained from the patient using the assistance of a Albanian speaking .         History of Present Illness:   Brit Sanchez is a 47 year old female with pain on her right mediolateral chest under the right breast.  The pain is not described as in her abdomen. She has had nausea. She states the pain is worse with deep breathing. No fever.              Past Medical History:     Past Medical History:   Diagnosis Date     Hypertension      Kidney stone              Past Surgical History:     Past Surgical History:   Procedure Laterality Date     HYSTEROSCOPY DIAGNOSTIC       Tubal ligation NOS               Social History:     Social History     Tobacco Use     Smoking status: Not on file     Smokeless tobacco: Not on file   Substance Use  Topics     Alcohol use: Not on file             Family History:   No family history of bleeding or clotting disorders.         Allergies:   No Known Allergies          Medications:   No current facility-administered medications on file prior to encounter.  carboxymethylcellulose PF (REFRESH LIQUIGEL) 1 % ophthalmic gel, Place 1 drop into both eyes 4 times daily  lisinopril-hydrochlorothiazide (ZESTORETIC) 20-12.5 MG tablet, Take 1 tablet by mouth daily        acetaminophen  1,000 mg Oral Q8H     enoxaparin ANTICOAGULANT  40 mg Subcutaneous Q24H     sodium chloride (PF)  3 mL Intracatheter Q8H            Review of Systems:   The 10 point review of systems is negative other than noted in the HPI.          Physical Exam:   /79   Pulse 63   Temp 97.3  F (36.3  C)   Resp 21   Wt 66.9 kg (147 lb 6.4 oz)   SpO2 96%   General - Well developed, well nourished female in no apparent distress  HEENT:  Head normocephalic and atraumatic, pupils equal and round, conjunctivae clear, no scleral icterus, mucous membranes moist, external ears and nose normal  Neck: Supple without thyromegaly or masses  Lymphatic: No cervical, or supraclavicular lymphadenopathy  Lungs: Clear to auscultation bilaterally  Heart: RR  Chest: pain to medio-lateral chest  Abdomen: soft, non-tender, no chu sign, no tenderness with deep palpation under right rib edge  Extremities: Warm without edema  Neurologic: nonfocal  Psychiatric: Mood and affect appropriate  Skin: Without lesions, rashes, or jaundice         Data:     WBC -   Lab Results   Component Value Date    WBC 8.0 01/16/2023       HgB -   Lab Results   Component Value Date    HGB 13.7 01/16/2023       Plt-   Lab Results   Component Value Date     01/16/2023       Liver Function Studies -   Recent Labs   Lab Test 01/16/23  0532   PROTTOTAL 7.9   ALBUMIN 4.4   BILITOTAL 0.3   ALKPHOS 112*   AST 25   ALT 34       Lipase-   Lab Results   Component Value Date    LIPASE 29 01/16/2023          Imaging:  All imaging studies reviewed by me.    Results for orders placed or performed during the hospital encounter of 01/16/23   Chest XR,  PA & LAT    Narrative    EXAM: XR CHEST 2 VIEWS  LOCATION: [facility]  DATE/TIME: 1/16/2023 6:26 AM    INDICATION: chest pain  COMPARISON: Portable chest radiograph 04/29/2021      Impression    IMPRESSION: Negative chest.   CT Chest Pulmonary Embolism w Contrast    Narrative    CT CHEST PULMONARY EMBOLISM WITH CONTRAST 1/16/2023 8:02 AM    CLINICAL HISTORY: Chest pain.  Not pregnant.  No imaging to rule out  PE in the last 24 hours. Pulmonary Embolism Rule-Out Criteria (PERC)  score not > 0.    TECHNIQUE: CT angiogram chest during arterial phase injection IV  contrast. 2D and 3D MIP reconstructions were performed by the CT  technologist. Dose reduction techniques were used.     CONTRAST: 63 mL Isovue-370    COMPARISON: None.    FINDINGS:  ANGIOGRAM CHEST: Pulmonary arteries are normal caliber and negative  for pulmonary emboli. Thoracic aorta is negative for dissection. No CT  evidence of right heart strain.    LUNGS AND PLEURA: Question some groundglass infiltrates in both lower  lobes vs. motion artifact. No effusions.    MEDIASTINUM/AXILLAE: No adenopathy or aneurysm.    CORONARY ARTERY CALCIFICATIONS: None.    UPPER ABDOMEN: No acute findings.    MUSCULOSKELETAL: No frankly destructive bony lesions.      Impression    IMPRESSION:  1.  No pulmonary embolism demonstrated.  2.  Question some minimal groundglass infiltrates in both lower lobes  vs. motion artifact.    RENAE YATES MD         SYSTEM ID:  [id number]   US Abdomen Limited (RUQ)    Narrative    US ABDOMEN LIMITED 1/16/2023 8:59 AM    CLINICAL HISTORY: Abdominal pain.  TECHNIQUE: Limited abdominal ultrasound.    COMPARISON: None.    FINDINGS:    GALLBLADDER: Cholelithiasis without cholecystitis.    BILE DUCTS: There is no biliary dilatation. The common duct measures 3  mm.    LIVER:  "Mild increased echotexture likely related to mild fatty  infiltration, no focal lesions.    RIGHT KIDNEY: No hydronephrosis.    PANCREAS: The visualized portions of the pancreas are normal.    No ascites.      Impression    IMPRESSION: Cholelithiasis without cholecystitis.     RENAE YATES MD         SYSTEM ID:  [id number]   Echo Limited     Value    LVEF  45-50%    Narrative    [id number]  [id number]  [id number]  [id number]^VINICIO^WIHTNEY^MARYURI SANCHEZ     [facility]  Echocardiography Laboratory  [address] [place] [address]     Name: BLANKA ROB  MRN: [id number]  : 1975  Study Date: 2023 01:13 PM  Age: 47 yrs  Gender: Female  Patient Location: [facility]  Reason For Study: Chest Pain  Ordering Physician: WHITNEY MOONEY  Performed By: Calista Latif     BSA: 1.6 m2  Height: 60 in  Weight: 147 lb  BP: 110/80 mmHg  ______________________________________________________________________________  Procedure  Limited Echo Adult. Optison (NDC #2215-7515) given intravenously.  ______________________________________________________________________________  Interpretation Summary     A limited study was performed  There is lateral wall dyskinesis.  Left ventricular systolic function is mildly reduced.  The right ventricle is normal in structure, function and size.  Doppler interrogation does not demonstrate signficant stenosis or  insufficiency involving cardiac valves.     There is a focal anterolateral region of dyskinesis. The pattern of wall  motion abnormality is not typical for myocardial ischemia as the apex is  spared. The differential diagnosis includes \"Takotsubo/Stress\" cardiomyopathy  or lateral wall ischemic event from ramus/ diagonal coronary artery disease.  ______________________________________________________________________________  Left Ventricle  The left ventricle is normal in size. There is normal left ventricular wall  thickness. Left ventricular systolic function is " mildly reduced. The visual  ejection fraction is 45-50%. Left ventricular diastolic function is  indeterminate. There is lateral wall dyskinesis. There is no thrombus seen in  the left ventricle.     Right Ventricle  The right ventricle is normal in structure, function and size. There is no  mass or thrombus in the right ventricle.     Atria  Normal left atrial size. The left atrial appendage is not well visualized.     Mitral Valve  The mitral valve leaflets appear normal. There is no evidence of stenosis,  fluttering, or prolapse. There is no mitral regurgitation noted. There is no  mitral valve stenosis.     Tricuspid Valve  Normal tricuspid valve. No tricuspid regurgitation. There is no tricuspid  stenosis.     Aortic Valve  The aortic valve is trileaflet. No aortic regurgitation is present. No aortic  stenosis is present.     Pulmonic Valve  The pulmonic valve is not well seen, but is grossly normal. There is no  pulmonic valvular regurgitation. There is no pulmonic valvular stenosis.     Vessels  The aortic root is normal size. Normal size ascending aorta. Inferior vena  cava not well visualized for estimation of right atrial pressure.     Pericardium  The pericardium appears normal. There is no pleural effusion.     Rhythm  Sinus rhythm was noted.  ______________________________________________________________________________  MMode/2D Measurements & Calculations  asc Aorta Diam: 3.5 cm     ______________________________________________________________________________  Report approved by: Dr. Sandro Blackburn 01/16/2023 02:08 PM               Time spent with the patient, reviewing the EMR, reviewing laboratory and imaging studies, more than 50% of which was counseling and coordinating care:  48 minutes.     Jose Armenta MD

## 2023-01-16 NOTE — PROGRESS NOTES
Patient was in cardiopulmonary for a treadmill stress echo.  Due to patients 9/10 chest pain and resting wall motion abnormality on echo the treadmill was not done, and only a limited echo was performed.

## 2023-01-16 NOTE — PROGRESS NOTES
Brief Cardiology Progress Note:  Discussed case with Dr. Carpio. Echocardiogram today showing mildly reduced LV systolic function with EF 45-50% and a focal anterolateral region of dyskinesis suspicious for possible Takotsubo/stress cardiomyopathy versus possible lateral wall ischemia in the ramus/diagonal coronary territory.     Spoke with patient with professional  connected via iPad to update her on the echocardiogram findings and recommendation for coronary angiogram and possible PCI tomorrow for further work-up and help exclude the possibility of ischemia. Risks and benefits of coronary angiogram discussed today including, bleeding, bruising, infection, allergic reaction, kidney damage (including need for dialysis), stroke, heart attack, vascular damage, emergency open heart surgery, up to and including death. Patient indicates understanding and is agreeable to proceed. She has no known history of an allergy to contrast dye.    Please keep n.p.o. after midnight tonight for the angiogram tomorrow.    DANIEL Stone, CNP   Nurse Practitioner  [facility] [place]  Pager: [phone]  Text Page  (8am - 5pm, M-F)

## 2023-01-16 NOTE — CONSULTS
Consult Date: 01/16/2023    REASON FOR CONSULTATION:  Possible ACS.    REFERRING PHYSICIAN:  Dr. Dewayne Ricardo     IMPRESSION:  This is a 47-year-old lady with risk factors of hypertension, who presents with very atypical chest pain and a small flat troponin rise of 51.  EKG is normal.    I think it is quite unlikely that she has acute coronary syndrome and I think heparin could be stopped.  She does admit to being under some stress at this time due to difficulty with a child and stress cardiomyopathy is consideration, though EKG is completely normal, which would be against the diagnosis.    At this time, I am not sure why she should have a flat troponin rise.  For further risk stratification, I would recommend a stress echocardiogram.  If this is normal, I do not think any further workup would be necessary.    HISTORY OF PRESENT ILLNESS:  This is a very pleasant 47-year-old lady who is Latvian-speaking.  Her English does appear adequate, and her  is here to help with translation.    There is no family history of any heart disease.  She is on Zestoretic for hypertension.  She does not smoke, drink or abuse drugs.  She is not diabetic.    She had a sudden onset of right-sided chest discomfort in the right upper abdomen.  This is not worse on exertion and is mild to moderate in intensity.  It is not related to breathing or food.  She tells me that she is currently pain-free.    PHYSICAL EXAMINATION:    GENERAL:  Very pleasant lady in no acute distress.  VITAL SIGNS:  Blood pressure 118/72.  Oxygen saturation is normal.  Heart rate is normal.  HEENT:  Unremarkable.  Mucous membranes appear normal.  SKIN:  She has no clubbing, no peripheral or central cyanosis.  NECK:  No raised LAUREN.  No thyromegaly.  CARDIAC:  Cardiac apex is not palpable.  Heart sounds are normal.  CHEST:  No chest wall tenderness.  Symmetrical expansion without the use of accessory muscles.  LUNGS:  Breath sounds are normal.  ABDOMEN:  Soft  and nontender.  No hepatosplenomegaly.  The abdominal aorta is not palpable.  EXTREMITIES:  No significant peripheral edema.  Peripheral pulses are preserved and intact.   CENTRAL NERVOUS SYSTEM:  Grossly intact.  PSYCHIATRIC:  Mood appears normal.    LABORATORY DATA:  EKG is normal.  Troponins are 51 and 51.  Chest x-ray normal.  Potassium is 3.1, possibly due to hydrochlorothiazide.  Sodium 138.  Creatinine is 0.42.  CBC within normal limits.    Ravinder Carpio MD, [facility]        D: 2023   T: 2023   MT: [facility]    Name:     BLANKA ROB  MRN:      [id number]        Account:      [id number]   :      1975           Consult Date: 2023     Document: [id number]

## 2023-01-16 NOTE — ED TRIAGE NOTES
Pt arrives to ED with one day of R sided CP that goes from under her R breast and around to her back. Pt states this began around noon, tried ibu around 1300 without relief. Denies medical hx or trauma. Denies n/v/d.

## 2023-01-16 NOTE — H&P
Admitted: 01/16/2023    CHIEF COMPLAINT:  Right-sided chest pain.    HISTORY OF PRESENT ILLNESS:  History was obtained from the patient.  This is a 47-year-old -speaking female with a history of hypertension and cholelithiasis, who presents with pain under her right breast.  The pain has been present since afternoon yesterday.  The pain comes and goes.  It is of a sharp character, worse with deep breathing.  No shortness of breath.  The pain radiates to the back.  As intense as a 9/10.  Associated with some chills.  She saw Dr. Kerr over here in the ED.  She is noted to have a troponin that is elevated at 50.  I am asked to admit her for further evaluation.  She denies any chest pain on her left side.    PAST MEDICAL HISTORY:  Hypertension and cholelithiasis.    HOME MEDICATIONS:  Home medication list regimen includes:  1.  Lisinopril.  2.  Hydrochlorothiazide.  3.  Lidex.    PAST SURGICAL HISTORY:  Significant for tubal ligation.    FAMILY HISTORY:  Significant for mother with lung cancer.    SOCIAL HISTORY:  She is .  She does not smoke.  She does not drink alcohol.    ALLERGIES:  No known drug allergies.    REVIEW OF SYSTEMS:  As mentioned in the HPI.  The pain radiates to her back.  Unrelated to activity.  All other systems are reviewed and deemed unremarkable and negative.    PHYSICAL EXAMINATION:    VITAL SIGNS:  Her temperature is 97.3, pulse 64, blood pressure is 137/76, respiratory rate 16, O2 sat is 96% on room air.  GENERAL:  She is alert, awake, oriented, coherent, nontoxic, in no acute distress. Seen with phone Kazakh interpretor.  HEENT:  Pupils equal, round, reactive to light.  LUNGS:  Clear to auscultation bilaterally.  HEART:  Regular rate.  S1, S2 normal.  No murmurs or gallops.  ABDOMEN:  Soft, tender in the area of the rib cage under the right breast, no rash noted.  Mild right upper quadrant tenderness.  No guarding or rigidity.  Hypoactive bowel sounds.  EXTREMITIES:  There is  no edema.  SKIN:  There is no rash.    NEUROLOGICAL:  She moves all extremities.    LABORATORY DATA:  Obtained here included a CMP which is grossly unremarkable other than a glucose of 133.  Her troponin is 51.  CBC with diff is grossly unremarkable.  Chest x-ray, 2 views carried out over here was read as negative chest.  An EKG shows normal sinus rhythm at 74 beats per minute with sinus arrhythmia.    IMPRESSION AND PLAN:     1.  Right chest pain, atypical for acute coronary syndrome.  Differential diagnosis would include possible pulmonary embolism verus gallbladder issues.  Atypical for ACS. We will admit her as an inpatient.  A D-dimer is pending.  She has been initiated on IV Heparin low dose protocol, which I will continue.  If her D-dimer is elevated, would obtain CT of the chest PE protocol, and may need high dose heparin protocol.  I will also get an ultrasound of her right upper quadrant.  2.  NSTEMI, Elevated troponin, we will trend it and monitor on telemetry.  If it continues to rise, we will get Cardiology involved.  We will get a baseline echocardiogram as well.  3.  Hypertension.  Home medication should be resumed when reconciled.    CODE STATUS:  Full code.    She will be admitted as an inpatient.    Ashly Buck MD        D: 2023   T: 2023   MT: JUVENAL    Name:     BLANKA ROB  MRN:      [id number]        Account:     [id number]   :      1975           Admitted:    2023       Document: [id number]

## 2023-01-17 LAB
ALBUMIN SERPL BCG-MCNC: 3.9 G/DL (ref 3.5–5.2)
ALP SERPL-CCNC: 92 U/L (ref 35–104)
ALT SERPL W P-5'-P-CCNC: 22 U/L (ref 10–35)
ANION GAP SERPL CALCULATED.3IONS-SCNC: 13 MMOL/L (ref 7–15)
ANION GAP SERPL CALCULATED.3IONS-SCNC: 9 MMOL/L (ref 7–15)
AST SERPL W P-5'-P-CCNC: 21 U/L (ref 10–35)
BASOPHILS # BLD AUTO: 0 10E3/UL (ref 0–0.2)
BASOPHILS NFR BLD AUTO: 1 %
BILIRUB SERPL-MCNC: 0.4 MG/DL
BUN SERPL-MCNC: 6.1 MG/DL (ref 6–20)
BUN SERPL-MCNC: 7.2 MG/DL (ref 6–20)
CALCIUM SERPL-MCNC: 8.7 MG/DL (ref 8.6–10)
CALCIUM SERPL-MCNC: 9.1 MG/DL (ref 8.6–10)
CHLORIDE SERPL-SCNC: 104 MMOL/L (ref 98–107)
CHLORIDE SERPL-SCNC: 106 MMOL/L (ref 98–107)
CREAT SERPL-MCNC: 0.44 MG/DL (ref 0.51–0.95)
CREAT SERPL-MCNC: 0.5 MG/DL (ref 0.51–0.95)
DEPRECATED HCO3 PLAS-SCNC: 23 MMOL/L (ref 22–29)
DEPRECATED HCO3 PLAS-SCNC: 27 MMOL/L (ref 22–29)
EOSINOPHIL # BLD AUTO: 0.2 10E3/UL (ref 0–0.7)
EOSINOPHIL NFR BLD AUTO: 3 %
ERYTHROCYTE [DISTWIDTH] IN BLOOD BY AUTOMATED COUNT: 13.2 % (ref 10–15)
GFR SERPL CREATININE-BSD FRML MDRD: >90 ML/MIN/1.73M2
GFR SERPL CREATININE-BSD FRML MDRD: >90 ML/MIN/1.73M2
GLUCOSE SERPL-MCNC: 89 MG/DL (ref 70–99)
GLUCOSE SERPL-MCNC: 97 MG/DL (ref 70–99)
HCG INTACT+B SERPL-ACNC: <1 MIU/ML
HCT VFR BLD AUTO: 41.3 % (ref 35–47)
HGB BLD-MCNC: 13.4 G/DL (ref 11.7–15.7)
IMM GRANULOCYTES # BLD: 0 10E3/UL
IMM GRANULOCYTES NFR BLD: 0 %
INR PPP: 1.05 (ref 0.85–1.15)
LYMPHOCYTES # BLD AUTO: 2.7 10E3/UL (ref 0.8–5.3)
LYMPHOCYTES NFR BLD AUTO: 49 %
MCH RBC QN AUTO: 30.3 PG (ref 26.5–33)
MCHC RBC AUTO-ENTMCNC: 32.4 G/DL (ref 31.5–36.5)
MCV RBC AUTO: 93 FL (ref 78–100)
MONOCYTES # BLD AUTO: 0.4 10E3/UL (ref 0–1.3)
MONOCYTES NFR BLD AUTO: 8 %
NEUTROPHILS # BLD AUTO: 2.1 10E3/UL (ref 1.6–8.3)
NEUTROPHILS NFR BLD AUTO: 39 %
NRBC # BLD AUTO: 0 10E3/UL
NRBC BLD AUTO-RTO: 0 /100
PLATELET # BLD AUTO: 314 10E3/UL (ref 150–450)
POTASSIUM SERPL-SCNC: 3.5 MMOL/L (ref 3.4–5.3)
POTASSIUM SERPL-SCNC: 3.7 MMOL/L (ref 3.4–5.3)
PROT SERPL-MCNC: 6.9 G/DL (ref 6.4–8.3)
RBC # BLD AUTO: 4.42 10E6/UL (ref 3.8–5.2)
SODIUM SERPL-SCNC: 140 MMOL/L (ref 136–145)
SODIUM SERPL-SCNC: 142 MMOL/L (ref 136–145)
WBC # BLD AUTO: 5.5 10E3/UL (ref 4–11)

## 2023-01-17 PROCEDURE — 250N000013 HC RX MED GY IP 250 OP 250 PS 637: Performed by: NURSE PRACTITIONER

## 2023-01-17 PROCEDURE — 93010 ELECTROCARDIOGRAM REPORT: CPT | Mod: XU | Performed by: INTERNAL MEDICINE

## 2023-01-17 PROCEDURE — C1894 INTRO/SHEATH, NON-LASER: HCPCS | Performed by: INTERNAL MEDICINE

## 2023-01-17 PROCEDURE — 99232 SBSQ HOSP IP/OBS MODERATE 35: CPT | Mod: 25 | Performed by: NURSE PRACTITIONER

## 2023-01-17 PROCEDURE — 250N000013 HC RX MED GY IP 250 OP 250 PS 637: Performed by: INTERNAL MEDICINE

## 2023-01-17 PROCEDURE — C1769 GUIDE WIRE: HCPCS | Performed by: INTERNAL MEDICINE

## 2023-01-17 PROCEDURE — C1887 CATHETER, GUIDING: HCPCS | Performed by: INTERNAL MEDICINE

## 2023-01-17 PROCEDURE — 120N000001 HC R&B MED SURG/OB

## 2023-01-17 PROCEDURE — 250N000011 HC RX IP 250 OP 636: Performed by: INTERNAL MEDICINE

## 2023-01-17 PROCEDURE — 85025 COMPLETE CBC W/AUTO DIFF WBC: CPT | Performed by: INTERNAL MEDICINE

## 2023-01-17 PROCEDURE — 99152 MOD SED SAME PHYS/QHP 5/>YRS: CPT | Performed by: INTERNAL MEDICINE

## 2023-01-17 PROCEDURE — 272N000001 HC OR GENERAL SUPPLY STERILE: Performed by: INTERNAL MEDICINE

## 2023-01-17 PROCEDURE — 99233 SBSQ HOSP IP/OBS HIGH 50: CPT | Performed by: INTERNAL MEDICINE

## 2023-01-17 PROCEDURE — 93454 CORONARY ARTERY ANGIO S&I: CPT | Performed by: INTERNAL MEDICINE

## 2023-01-17 PROCEDURE — 258N000003 HC RX IP 258 OP 636: Performed by: NURSE PRACTITIONER

## 2023-01-17 PROCEDURE — B2111ZZ FLUOROSCOPY OF MULTIPLE CORONARY ARTERIES USING LOW OSMOLAR CONTRAST: ICD-10-PCS | Performed by: INTERNAL MEDICINE

## 2023-01-17 PROCEDURE — 250N000009 HC RX 250: Performed by: INTERNAL MEDICINE

## 2023-01-17 PROCEDURE — 93454 CORONARY ARTERY ANGIO S&I: CPT | Mod: 26 | Performed by: INTERNAL MEDICINE

## 2023-01-17 PROCEDURE — 80053 COMPREHEN METABOLIC PANEL: CPT | Performed by: INTERNAL MEDICINE

## 2023-01-17 PROCEDURE — 85610 PROTHROMBIN TIME: CPT | Performed by: INTERNAL MEDICINE

## 2023-01-17 PROCEDURE — 36415 COLL VENOUS BLD VENIPUNCTURE: CPT | Performed by: INTERNAL MEDICINE

## 2023-01-17 RX ORDER — LORAZEPAM 0.5 MG/1
0.5 TABLET ORAL
Status: DISCONTINUED | OUTPATIENT
Start: 2023-01-17 | End: 2023-01-17 | Stop reason: HOSPADM

## 2023-01-17 RX ORDER — VERAPAMIL HYDROCHLORIDE 2.5 MG/ML
INJECTION, SOLUTION INTRAVENOUS
Status: DISCONTINUED
Start: 2023-01-17 | End: 2023-01-17 | Stop reason: HOSPADM

## 2023-01-17 RX ORDER — NALOXONE HYDROCHLORIDE 0.4 MG/ML
0.4 INJECTION, SOLUTION INTRAMUSCULAR; INTRAVENOUS; SUBCUTANEOUS
Status: ACTIVE | OUTPATIENT
Start: 2023-01-17 | End: 2023-01-17

## 2023-01-17 RX ORDER — IOPAMIDOL 755 MG/ML
INJECTION, SOLUTION INTRAVASCULAR
Status: DISCONTINUED | OUTPATIENT
Start: 2023-01-17 | End: 2023-01-17 | Stop reason: HOSPADM

## 2023-01-17 RX ORDER — FENTANYL CITRATE 50 UG/ML
25 INJECTION, SOLUTION INTRAMUSCULAR; INTRAVENOUS
Status: DISCONTINUED | OUTPATIENT
Start: 2023-01-17 | End: 2023-01-18 | Stop reason: HOSPADM

## 2023-01-17 RX ORDER — OXYCODONE HYDROCHLORIDE 5 MG/1
5 TABLET ORAL EVERY 4 HOURS PRN
Status: DISCONTINUED | OUTPATIENT
Start: 2023-01-17 | End: 2023-01-18 | Stop reason: HOSPADM

## 2023-01-17 RX ORDER — HEPARIN SODIUM 1000 [USP'U]/ML
INJECTION, SOLUTION INTRAVENOUS; SUBCUTANEOUS
Status: DISCONTINUED | OUTPATIENT
Start: 2023-01-17 | End: 2023-01-17 | Stop reason: HOSPADM

## 2023-01-17 RX ORDER — NALOXONE HYDROCHLORIDE 0.4 MG/ML
0.2 INJECTION, SOLUTION INTRAMUSCULAR; INTRAVENOUS; SUBCUTANEOUS
Status: ACTIVE | OUTPATIENT
Start: 2023-01-17 | End: 2023-01-17

## 2023-01-17 RX ORDER — ATROPINE SULFATE 0.1 MG/ML
0.5 INJECTION INTRAVENOUS
Status: ACTIVE | OUTPATIENT
Start: 2023-01-17 | End: 2023-01-17

## 2023-01-17 RX ORDER — ACETAMINOPHEN 325 MG/1
650 TABLET ORAL EVERY 4 HOURS PRN
Status: DISCONTINUED | OUTPATIENT
Start: 2023-01-17 | End: 2023-01-18 | Stop reason: HOSPADM

## 2023-01-17 RX ORDER — FENTANYL CITRATE 50 UG/ML
INJECTION, SOLUTION INTRAMUSCULAR; INTRAVENOUS
Status: DISCONTINUED
Start: 2023-01-17 | End: 2023-01-17 | Stop reason: HOSPADM

## 2023-01-17 RX ORDER — CARVEDILOL 3.12 MG/1
3.12 TABLET ORAL 2 TIMES DAILY WITH MEALS
Status: DISCONTINUED | OUTPATIENT
Start: 2023-01-17 | End: 2023-01-18 | Stop reason: HOSPADM

## 2023-01-17 RX ORDER — LIDOCAINE HYDROCHLORIDE 10 MG/ML
INJECTION, SOLUTION EPIDURAL; INFILTRATION; INTRACAUDAL; PERINEURAL
Status: DISCONTINUED
Start: 2023-01-17 | End: 2023-01-17 | Stop reason: HOSPADM

## 2023-01-17 RX ORDER — LIDOCAINE 40 MG/G
CREAM TOPICAL
Status: DISCONTINUED | OUTPATIENT
Start: 2023-01-17 | End: 2023-01-17 | Stop reason: HOSPADM

## 2023-01-17 RX ORDER — OXYCODONE HYDROCHLORIDE 10 MG/1
10 TABLET ORAL EVERY 4 HOURS PRN
Status: DISCONTINUED | OUTPATIENT
Start: 2023-01-17 | End: 2023-01-18 | Stop reason: HOSPADM

## 2023-01-17 RX ORDER — NITROGLYCERIN 5 MG/ML
VIAL (ML) INTRAVENOUS
Status: DISCONTINUED | OUTPATIENT
Start: 2023-01-17 | End: 2023-01-17 | Stop reason: HOSPADM

## 2023-01-17 RX ORDER — FLUMAZENIL 0.1 MG/ML
0.2 INJECTION, SOLUTION INTRAVENOUS
Status: ACTIVE | OUTPATIENT
Start: 2023-01-17 | End: 2023-01-17

## 2023-01-17 RX ORDER — SODIUM CHLORIDE 9 MG/ML
75 INJECTION, SOLUTION INTRAVENOUS CONTINUOUS
Status: DISCONTINUED | OUTPATIENT
Start: 2023-01-17 | End: 2023-01-17

## 2023-01-17 RX ORDER — ASPIRIN 81 MG/1
81 TABLET ORAL DAILY
Status: DISCONTINUED | OUTPATIENT
Start: 2023-01-17 | End: 2023-01-17

## 2023-01-17 RX ORDER — FENTANYL CITRATE 50 UG/ML
INJECTION, SOLUTION INTRAMUSCULAR; INTRAVENOUS
Status: DISCONTINUED | OUTPATIENT
Start: 2023-01-17 | End: 2023-01-17 | Stop reason: HOSPADM

## 2023-01-17 RX ORDER — LISINOPRIL AND HYDROCHLOROTHIAZIDE 12.5; 2 MG/1; MG/1
1 TABLET ORAL DAILY
Status: DISCONTINUED | OUTPATIENT
Start: 2023-01-17 | End: 2023-01-17

## 2023-01-17 RX ORDER — LISINOPRIL 20 MG/1
20 TABLET ORAL DAILY
Status: DISCONTINUED | OUTPATIENT
Start: 2023-01-17 | End: 2023-01-18 | Stop reason: HOSPADM

## 2023-01-17 RX ORDER — LORAZEPAM 2 MG/ML
0.5 INJECTION INTRAMUSCULAR
Status: DISCONTINUED | OUTPATIENT
Start: 2023-01-17 | End: 2023-01-17 | Stop reason: HOSPADM

## 2023-01-17 RX ORDER — HYDROCHLOROTHIAZIDE 12.5 MG/1
12.5 CAPSULE ORAL DAILY
Status: DISCONTINUED | OUTPATIENT
Start: 2023-01-17 | End: 2023-01-17 | Stop reason: ALTCHOICE

## 2023-01-17 RX ORDER — NITROGLYCERIN 5 MG/ML
VIAL (ML) INTRAVENOUS
Status: DISCONTINUED
Start: 2023-01-17 | End: 2023-01-17 | Stop reason: HOSPADM

## 2023-01-17 RX ORDER — VERAPAMIL HYDROCHLORIDE 2.5 MG/ML
INJECTION, SOLUTION INTRAVENOUS
Status: DISCONTINUED | OUTPATIENT
Start: 2023-01-17 | End: 2023-01-17 | Stop reason: HOSPADM

## 2023-01-17 RX ORDER — SODIUM CHLORIDE 9 MG/ML
INJECTION, SOLUTION INTRAVENOUS CONTINUOUS
Status: DISCONTINUED | OUTPATIENT
Start: 2023-01-17 | End: 2023-01-17 | Stop reason: HOSPADM

## 2023-01-17 RX ORDER — HEPARIN SODIUM 1000 [USP'U]/ML
INJECTION, SOLUTION INTRAVENOUS; SUBCUTANEOUS
Status: DISCONTINUED
Start: 2023-01-17 | End: 2023-01-17 | Stop reason: HOSPADM

## 2023-01-17 RX ADMIN — ASPIRIN 325 MG: 325 TABLET, COATED ORAL at 10:19

## 2023-01-17 RX ADMIN — ACETAMINOPHEN 1000 MG: 500 TABLET ORAL at 06:15

## 2023-01-17 RX ADMIN — ENOXAPARIN SODIUM 40 MG: 40 INJECTION SUBCUTANEOUS at 19:43

## 2023-01-17 RX ADMIN — SODIUM CHLORIDE: 9 INJECTION, SOLUTION INTRAVENOUS at 06:17

## 2023-01-17 RX ADMIN — POTASSIUM CHLORIDE 20 MEQ: 1500 TABLET, EXTENDED RELEASE ORAL at 10:19

## 2023-01-17 RX ADMIN — ACETAMINOPHEN 1000 MG: 500 TABLET ORAL at 19:42

## 2023-01-17 RX ADMIN — CARVEDILOL 3.12 MG: 3.12 TABLET, FILM COATED ORAL at 17:13

## 2023-01-17 RX ADMIN — LISINOPRIL 20 MG: 20 TABLET ORAL at 19:43

## 2023-01-17 ASSESSMENT — ACTIVITIES OF DAILY LIVING (ADL)
ADLS_ACUITY_SCORE: 18

## 2023-01-17 NOTE — PROGRESS NOTES
Patient Transfer Information  Patient connected to monitoring equipment on arrival: N/A     Patient connected to wall oxygen on arrival: N/A    Belongings: Transferred with patient    Safety check completed: Yes

## 2023-01-17 NOTE — PROGRESS NOTES
Patient Transfer Information  Patient connected to monitoring equipment on arrival: yes Continuous pulse oximetry, tele     Patient connected to wall oxygen on arrival: N/A    Belongings: stayed in room    Safety check completed: Yes

## 2023-01-17 NOTE — PROCEDURES
[facility]    Procedure: *Cath without PCI    Date/Time: 1/17/2023 1:32 PM  Performed by: Sandro Denney MD  Authorized by: Sandro Denney MD       UNIVERSAL PROTOCOL   Site Marked: Yes  Prior Images Obtained and Reviewed:  Yes  Required items: Required blood products, implants, devices and special equipment available    Patient identity confirmed:  Verbally with patient  Patient was reevaluated immediately before administering moderate or deep sedation or anesthesia  Confirmation Checklist:  Patient's identity using two indicators  Time out: Immediately prior to the procedure a time out was called    Universal Protocol: the Joint Commission Universal Protocol was followed    Preparation: Patient was prepped and draped in usual sterile fashion       ANESTHESIA    Local Anesthetic: Lidocaine 1% without epinephrine      SEDATION  Patient Sedated: Yes    Sedation:  Fentanyl and midazolam  Vital signs: Vital signs monitored during sedation      PROCEDURE  Describe Procedure: Procedure  1) CAG  Approach RTR  Complications none noted  Indication NSTEMI CAD vs Takotsubo    Findings    Normal smooth coronaries RCA dominant    Assess likely Takotsubo  Plan  BP control  Consider BB  Follow-up TTE in 4 to 6 weeks    Олег  Patient Tolerance:  Patient tolerated the procedure well with no immediate complications  Length of time physician/provider present for 1:1 monitoring during sedation: 10

## 2023-01-17 NOTE — PLAN OF CARE
VSS on ra. A/O. Ukrainian speaking. LS clear. Denied pain. NPO. Independent with transfers. RPWELLINGTON IBRAHIM. Angio this a.m.

## 2023-01-17 NOTE — PROGRESS NOTES
[facility]  Hospitalist Progress Note  Dewayne Ricardo M.D., M.B.A.   01/17/2023    Reason for Stay/active problem list      Severe right-sided chest pain    Elevated troponin    Takotsubo/stress cardiomyopathy          Assessment and Plan:        Summary of Stay: Brit a 47-year-old female who presents with right-sided chest pain radiating to her right shoulder.  She had elevated troponin as well and started on a heparin drip due to concern for non-ST segment elevation MI.  Patient was closely monitored in the ER and treated with pain medications.  Cardiology team was consulted to assist with further recommendations.    Problem List with Assessment and Plan:      1. Right-sided chest pain: Atypical chest pain for acute cardiac ischemia.  EKG showed no evidence of ischemic EKG pattern.  Patient was empirically treated with heparin infusion in the emergency department.    Etiology of her chest pain is not entirely clear.    Cardiology was consulted and recommendation obtained     Patient was sent for stress testing with a treadmill stress echo.  Unfortunately patient developed severe pain and resting wall motion abnormality on echo limiting the study.    CAG done on 1/17 showed normal coronaries indication stress CMP and cardiology recommended medical management and follow echo in 4-6 weeks     Currently pain resolved , continue to monitor on tele , mediations per cards       2.  Elevated  troponin:      High-sensitivity troponin T was elevated at 51 on presentation.  Trended down to 42 after 4 hours.    We will get another troponin level in a few hours, monitor on telemetry, cardiology following.        3. Cholelithiasis      Evidence of cholelithiasis and ultrasonographic exam without evidence of acute cholecystitis.    May need outpatient lap ximena           Plan for today:    Telemetry monitoring      VTE Prophylaxis: Enoxaparin (Lovenox) SQ  Code Status: Full Code  Diet: Advance Diet as  "Tolerated: Clear Liquid Diet    Villegas Catheter: Not present    Family updated today: Yes      Disposition:  May discharge home tomorrow if okay with cardiology         Interval History (Subjective):      Patient is seen and examined by me today and medical record reviewed.Overnight events noted and care discussed with nursing staff.    doing well; no cp, sob, n/v/d, or abd pain.              Physical Exam:        Last Vital Signs:  /68 (BP Location: Left arm)   Pulse 62   Temp 98.7  F (37.1  C) (Oral)   Resp 18   Ht 1.6 m (5' 3\")   Wt 62.6 kg (138 lb 1.6 oz)   SpO2 97%   BMI 24.46 kg/m      No intake/output data recorded.    Wt Readings from Last 5 Encounters:   01/16/23 62.6 kg (138 lb 1.6 oz)   04/29/21 63.5 kg (140 lb)        Constitutional: Awake, alert, cooperative, no apparent distress     Respiratory: Clear to auscultation bilaterally, no crackles or wheezing   Cardiovascular: Regular rate and rhythm, normal S1 and S2, and no murmur noted   Abdomen: Normal bowel sounds, soft, non-distended, non-tender   Skin: No new rashes, no cyanosis, dry to touch   Neuro: Alert with  no new focal weakness   Extremities: No edema   Other(s):        All other systems: Negative          Medications:        All current medications were reviewed with changes reflected in problem list.         Data:      All new lab and imaging data was reviewed.      Data reviewed today: I reviewed all new labs and imaging results over the last 24 hours. I personally reviewed       Recent Labs   Lab 01/17/23  0600 01/16/23  0532   WBC 5.5 8.0   HGB 13.4 13.7   HCT 41.3 42.2   MCV 93 93    320     No results for input(s): CULT in the last 168 hours.  Recent Labs   Lab 01/17/23  0600 01/16/23  1351 01/16/23  0532    140 138   POTASSIUM 3.7 3.5 3.8   CHLORIDE 106 104 102   CO2 27 23 23   ANIONGAP 9 13 13   GLC 97 89 133*   BUN 7.2 6.1 7.4   CR 0.50* 0.44* 0.42*   GFRESTIMATED >90 >90 >90   ANA 9.1 8.7 9.0   PROTTOTAL 6.9  " --  7.9   ALBUMIN 3.9  --  4.4   BILITOTAL 0.4  --  0.3   ALKPHOS 92  --  112*   AST 21  --  25   ALT 22  --  34       Recent Labs   Lab 01/17/23  0600 01/16/23  1351 01/16/23  0532   GLC 97 89 133*       Recent Labs   Lab 01/17/23  0600   INR 1.05         No results for input(s): TROPONIN, TROPI, TROPR in the last 168 hours.    Invalid input(s): TROP, TROPONINIES    Recent Results (from the past 48 hour(s))   Chest XR,  PA & LAT    Narrative    EXAM: XR CHEST 2 VIEWS  LOCATION: [facility]  DATE/TIME: 1/16/2023 6:26 AM    INDICATION: chest pain  COMPARISON: Portable chest radiograph 04/29/2021      Impression    IMPRESSION: Negative chest.   CT Chest Pulmonary Embolism w Contrast    Narrative    CT CHEST PULMONARY EMBOLISM WITH CONTRAST 1/16/2023 8:02 AM    CLINICAL HISTORY: Chest pain.  Not pregnant.  No imaging to rule out  PE in the last 24 hours. Pulmonary Embolism Rule-Out Criteria (PERC)  score not > 0.    TECHNIQUE: CT angiogram chest during arterial phase injection IV  contrast. 2D and 3D MIP reconstructions were performed by the CT  technologist. Dose reduction techniques were used.     CONTRAST: 63 mL Isovue-370    COMPARISON: None.    FINDINGS:  ANGIOGRAM CHEST: Pulmonary arteries are normal caliber and negative  for pulmonary emboli. Thoracic aorta is negative for dissection. No CT  evidence of right heart strain.    LUNGS AND PLEURA: Question some groundglass infiltrates in both lower  lobes vs. motion artifact. No effusions.    MEDIASTINUM/AXILLAE: No adenopathy or aneurysm.    CORONARY ARTERY CALCIFICATIONS: None.    UPPER ABDOMEN: No acute findings.    MUSCULOSKELETAL: No frankly destructive bony lesions.      Impression    IMPRESSION:  1.  No pulmonary embolism demonstrated.  2.  Question some minimal groundglass infiltrates in both lower lobes  vs. motion artifact.    RENAE YATES MD         SYSTEM ID:  [id number]   US Abdomen Limited (RUQ)    Narrative    US ABDOMEN LIMITED  "2023 8:59 AM    CLINICAL HISTORY: Abdominal pain.  TECHNIQUE: Limited abdominal ultrasound.    COMPARISON: None.    FINDINGS:    GALLBLADDER: Cholelithiasis without cholecystitis.    BILE DUCTS: There is no biliary dilatation. The common duct measures 3  mm.    LIVER: Mild increased echotexture likely related to mild fatty  infiltration, no focal lesions.    RIGHT KIDNEY: No hydronephrosis.    PANCREAS: The visualized portions of the pancreas are normal.    No ascites.      Impression    IMPRESSION: Cholelithiasis without cholecystitis.     RENAE YATES MD         SYSTEM ID:  [id number]   Echo Limited   Result Value    LVEF  45-50%    Narrative    [id number]  [id number]  [id number]  [id number]^VINICIO^WHITNEY^MARYURI SANCHEZ     [facility]  Echocardiography Laboratory  [address] [place] [address]     Name: BLANKA ROB  MRN: [id number]  : 1975  Study Date: 2023 01:13 PM  Age: 47 yrs  Gender: Female  Patient Location: [facility]  Reason For Study: Chest Pain  Ordering Physician: WHITNEY MOONEY  Performed By: Calista Latif     BSA: 1.6 m2  Height: 60 in  Weight: 147 lb  BP: 110/80 mmHg  ______________________________________________________________________________  Procedure  Limited Echo Adult. Optison (NDC #[id number]) given intravenously.  ______________________________________________________________________________  Interpretation Summary     A limited study was performed  There is lateral wall dyskinesis.  Left ventricular systolic function is mildly reduced.  The right ventricle is normal in structure, function and size.  Doppler interrogation does not demonstrate signficant stenosis or  insufficiency involving cardiac valves.     There is a focal anterolateral region of dyskinesis. The pattern of wall  motion abnormality is not typical for myocardial ischemia as the apex is  spared. The differential diagnosis includes \"Takotsubo/Stress\" cardiomyopathy  or lateral wall ischemic " event from ramus/ diagonal coronary artery disease.  ______________________________________________________________________________  Left Ventricle  The left ventricle is normal in size. There is normal left ventricular wall  thickness. Left ventricular systolic function is mildly reduced. The visual  ejection fraction is 45-50%. Left ventricular diastolic function is  indeterminate. There is lateral wall dyskinesis. There is no thrombus seen in  the left ventricle.     Right Ventricle  The right ventricle is normal in structure, function and size. There is no  mass or thrombus in the right ventricle.     Atria  Normal left atrial size. The left atrial appendage is not well visualized.     Mitral Valve  The mitral valve leaflets appear normal. There is no evidence of stenosis,  fluttering, or prolapse. There is no mitral regurgitation noted. There is no  mitral valve stenosis.     Tricuspid Valve  Normal tricuspid valve. No tricuspid regurgitation. There is no tricuspid  stenosis.     Aortic Valve  The aortic valve is trileaflet. No aortic regurgitation is present. No aortic  stenosis is present.     Pulmonic Valve  The pulmonic valve is not well seen, but is grossly normal. There is no  pulmonic valvular regurgitation. There is no pulmonic valvular stenosis.     Vessels  The aortic root is normal size. Normal size ascending aorta. Inferior vena  cava not well visualized for estimation of right atrial pressure.     Pericardium  The pericardium appears normal. There is no pleural effusion.     Rhythm  Sinus rhythm was noted.  ______________________________________________________________________________  MMode/2D Measurements & Calculations  asc Aorta Diam: 3.5 cm     ______________________________________________________________________________  Report approved by: Dr. Sandro Blackburn 01/16/2023 02:08 PM             COVID Status:  COVID-19 PCR Results    COVID-19 PCR Results 1/7/22 10/5/22   COVID-19 Virus by PCR  (External Result) Not Detected Not Detected      Comments are available for some flowsheets but are not being displayed.         COVID-19 Antibody Results, Testing for Immunity    COVID-19 Antibody Results, Testing for Immunity   No data to display.              Disclaimer: This note consists of symbols derived from keyboarding, dictation and/or voice recognition software. As a result, there may be errors in the script that have gone undetected. Please consider this when interpreting information found in this chart.

## 2023-01-17 NOTE — PLAN OF CARE
RN giving report: James MCDANIEL receiving report: willem Ansari charge nurse      S:  Procedure performed: Coronary angiogram    B:  Why procedure needed: chest pain    A:  Assessment of area: Intact    R:  Recommendations: follow protocol to remove TRband      Family updated: Per MD

## 2023-01-17 NOTE — PLAN OF CARE
VSS, afeb., LS are clear, 98% on RA. Pt has chest discomfort of 2-5. Tylenol scheduled Q8H. PRN dilauded but pt declined it this shift. Good appetite, ate 100%, good fluid intake. Pt up indep in room. Plan to have angiogram in AM, pt watched Faroese version of Angiogram on tablet.Pt speaks Faroese, does not understand English,  used for assessing and answering questions. Pt stated she understood and had no questions. She did state she was scared.Pt will be NPO after MN. Tele is SR.

## 2023-01-17 NOTE — PROGRESS NOTES
[facility]    Cardiology Progress Note    Primary Cardiologist: Dr. Carpio    Date of Admission: 01/16/2023  Service Date: 01/17/2023    Summary:  Ms. Brit Sanchez is a very pleasant primarily Faroese speaking 47 year old female with a past medical history of hypertension and cholelithiasis who was admitted on 1/16/2023 for pain under her right breast. Cardiology was consulted as her troponin was elevated.    Interval History   Spoke with patient via . Patient is resting comfortably. Right sided chest pain resolved yesterday evening and has not recurred. She denies shortness of breath or palpitations. We reviewed the plan of care as outlined below.    Telemetry: NSR    Assessment & Plan   1. Suspected Takotsubo/stress cardiomyopathy   - Presents with atypical right-sided chest pain with troponin elevation to 51 and flat.  - EKG with no acute ischemic changes.  - Echocardiogram 1/16/23 showing mildly reduced LV systolic function with EF 45-50% and a focal anterolateral region of dyskinesis suspicious for possible Takotsubo/stress cardiomyopathy versus possible lateral wall ischemia in the ramus/diagonal coronary territory.  - Patient notes she received a call Sunday that a family member had passed away so she has been grieving.     2. Hypertension    3. Cholelithiasis    Plan:   1. Coronary angiogram and possible PCI today.  2. Continue with aspirin. Will switch PTA lisinopril-hydrochlorathiazide 20-12.5 mg daily to just lisinopril 20 mg daily to allow room in her blood pressure to add low dose beta-blocker with carvedilol 3.125 mg BID.  3. Further recommendations pending the angiogram findings. If this shows no significant CAD and confirms stress cardiomyopathy, can likely discharge home today and would repeat an echocardiogram in ~1-2 months.   4.  Orders place for follow up with a cardiology LIBBY in ~1-2 weeks post discharge.    Thank you for the opportunity to  participate in this pleasant patient's care.     DANIEL Stone, CNP   Nurse Practitioner  [facility]  Pager: [phone]  Text Page  (8am - 5pm, M-F)    Patient Active Problem List   Diagnosis     NSTEMI (non-ST elevated myocardial infarction) (H)     Other social stressor       Physical Exam   Temp: 98.6  F (37  C) Temp src: Oral BP: 130/66 Pulse: 65   Resp: 18 SpO2: 95 % O2 Device: None (Room air)    Vitals:    01/16/23 0643 01/16/23 1719   Weight: 66.9 kg (147 lb 6.4 oz) 62.6 kg (138 lb 1.6 oz)     Vital Signs with Ranges  Temp:  [98.2  F (36.8  C)-98.7  F (37.1  C)] 98.6  F (37  C)  Pulse:  [63-76] 65  Resp:  [18-19] 18  BP: (116-138)/(66-79) 130/66  SpO2:  [94 %-96 %] 95 %  No intake/output data recorded.    General: Appears her stated age, well nourished, and in no acute distress.  Eyes: No scleral icterus.  HEENT: Neck supple. No JVD.  Cardiovascular: Regular rate and rhythm, normal S1 and S2. No murmur, rub, or gallop.  Extremities: Moves all extremities well and symmetrically. No LE edema.  Respiratory: Breathing non-labored. Lungs clear to auscultation with no crackles or wheezes bilaterally.  Skin: No pallor or cyanosis.  Psych: Appropriate affect. Mentation normal.  Neurological: No gross motor neurological focal deficits.    Medications     - MEDICATION INSTRUCTIONS -       sodium chloride 150 mL/hr at 01/17/23 0617       acetaminophen  1,000 mg Oral Q8H     enoxaparin ANTICOAGULANT  40 mg Subcutaneous Q24H     sodium chloride (PF)  3 mL Intracatheter Q8H     sodium chloride (PF)  3 mL Intracatheter Q8H     Data   Recent Labs   Lab 01/17/23  0600 01/16/23  1351 01/16/23  0532   WBC 5.5  --  8.0   HGB 13.4  --  13.7   MCV 93  --  93     --  320   INR 1.05  --   --     140 138   POTASSIUM 3.7 3.5 3.8   CHLORIDE 106 104 102   CO2 27 23 23   BUN 7.2 6.1 7.4   CR 0.50* 0.44* 0.42*   ANIONGAP 9 13 13   ANA 9.1 8.7 9.0   GLC 97 89 133*   ALBUMIN 3.9  --  4.4   PROTTOTAL  6.9  --  7.9   BILITOTAL 0.4  --  0.3   ALKPHOS 92  --  112*   ALT 22  --  34   AST 21  --  25   LIPASE  --   --  29     This note was completed in part using Dragon voice recognition software. Although reviewed after completion, some word and grammatical errors may occur.

## 2023-01-18 ENCOUNTER — APPOINTMENT (OUTPATIENT)
Dept: INTERPRETER SERVICES | Facility: CLINIC | Age: 48
End: 2023-01-18
Payer: MEDICAID

## 2023-01-18 VITALS
TEMPERATURE: 97.8 F | OXYGEN SATURATION: 95 % | RESPIRATION RATE: 20 BRPM | WEIGHT: 138.1 LBS | DIASTOLIC BLOOD PRESSURE: 53 MMHG | HEIGHT: 63 IN | SYSTOLIC BLOOD PRESSURE: 101 MMHG | BODY MASS INDEX: 24.47 KG/M2 | HEART RATE: 58 BPM

## 2023-01-18 LAB
ATRIAL RATE - MUSE: 66 BPM
DIASTOLIC BLOOD PRESSURE - MUSE: NORMAL MMHG
INTERPRETATION ECG - MUSE: NORMAL
P AXIS - MUSE: 37 DEGREES
PR INTERVAL - MUSE: 164 MS
QRS DURATION - MUSE: 86 MS
QT - MUSE: 446 MS
QTC - MUSE: 467 MS
R AXIS - MUSE: 72 DEGREES
SYSTOLIC BLOOD PRESSURE - MUSE: NORMAL MMHG
T AXIS - MUSE: 130 DEGREES
VENTRICULAR RATE- MUSE: 66 BPM

## 2023-01-18 PROCEDURE — 99239 HOSP IP/OBS DSCHRG MGMT >30: CPT | Performed by: INTERNAL MEDICINE

## 2023-01-18 PROCEDURE — 250N000013 HC RX MED GY IP 250 OP 250 PS 637: Performed by: INTERNAL MEDICINE

## 2023-01-18 PROCEDURE — 250N000013 HC RX MED GY IP 250 OP 250 PS 637: Performed by: NURSE PRACTITIONER

## 2023-01-18 PROCEDURE — 99232 SBSQ HOSP IP/OBS MODERATE 35: CPT | Performed by: NURSE PRACTITIONER

## 2023-01-18 RX ORDER — CARVEDILOL 3.12 MG/1
3.12 TABLET ORAL 2 TIMES DAILY WITH MEALS
Qty: 60 TABLET | Refills: 0 | Status: SHIPPED | OUTPATIENT
Start: 2023-01-18 | End: 2023-02-03

## 2023-01-18 RX ORDER — LISINOPRIL 10 MG/1
10 TABLET ORAL DAILY
Qty: 30 TABLET | Refills: 0 | Status: SHIPPED | OUTPATIENT
Start: 2023-01-18 | End: 2023-02-03

## 2023-01-18 RX ADMIN — LISINOPRIL 20 MG: 20 TABLET ORAL at 10:47

## 2023-01-18 RX ADMIN — ACETAMINOPHEN 1000 MG: 500 TABLET ORAL at 05:24

## 2023-01-18 RX ADMIN — CARVEDILOL 3.12 MG: 3.12 TABLET, FILM COATED ORAL at 10:46

## 2023-01-18 ASSESSMENT — ACTIVITIES OF DAILY LIVING (ADL)
ADLS_ACUITY_SCORE: 18

## 2023-01-18 NOTE — PLAN OF CARE
Goal Outcome Evaluation:      Plan of Care Reviewed With: patient    Overall Patient Progress: improvingOverall Patient Progress: improving  Patient discharged in care of family. Alert, oriented, up independent. Discharge discussed and patient stated agreement and understanding with plan. [employer] services used.  All belongings packed and patient states all belongings accounted for. Discharge completed.

## 2023-01-18 NOTE — PLAN OF CARE
VSS on ra. A/O. Greek speaking with some English. Angio yesterday with right arm approach. Some edema in right arm. Measuring circumference of arm and it continues at 19.5 inches. Arm has been iced and elevated. Pt states some pain. Tylenol given. CMS intact. Denies numbness and tingling. Tele SR. Regular diet. Independent in room.

## 2023-01-18 NOTE — PROGRESS NOTES
[facility]    Cardiology Progress Note    Primary Cardiologist: Dr. Carpio    Date of Admission: 01/16/2023  Service Date: 01/18/2023    Summary:  Ms. Brit Sanchez is a very pleasant primarily Occitan speaking 47 year old female with a past medical history of hypertension and cholelithiasis who was admitted on 1/16/2023 for pain under her right breast. Cardiology was consulted as her troponin was elevated.    Interval History   Spoke with patient via . Reviewed the coronary angiogram findings. Care of the access site reviewed. Patient is resting comfortably. Right sided chest pain resolved the evening of 1/16 and has not recurred. She denies shortness of breath or palpitations. We reviewed the plan of care as outlined below.    Telemetry: NSR    Assessment & Plan   1. Takotsubo/stress cardiomyopathy   - Presents with atypical right-sided chest pain with troponin elevation to 51 and flat. EKG with no acute ischemic changes.  - Echocardiogram 1/16/23 showing mildly reduced LV systolic function with EF 45-50% and a focal anterolateral region of dyskinesis suspicious for possible Takotsubo/stress cardiomyopathy versus possible lateral wall ischemia in the ramus/diagonal coronary territory.  - Patient notes she received a call Sunday that a family member had passed away so she has been grieving.   - Coronary angiogram 1/17/22 showing normal coronaries with RCA dominant system.     2. Hypertension    3. Cholelithiasis    Plan:   1. Can stop aspirin. Prior to admission lisinopril-hydrochlorathiazide 20-12.5 mg switched to just lisinopril 20 mg daily and carvedilol started at 3.125 mg BID. BP borderline low so could reduce lisinopril to 10 mg once daily if needed.  2. Would repeat an echocardiogram in ~1-2 months for reassessment of LV systolic function.   3. Orders place for follow up with a cardiology LIBBY in ~1-2 weeks post discharge.  4. Anticipate discharge home today.  We will sign off. Please do not hesitate to call with questions.    Thank you for the opportunity to participate in this pleasant patient's care.     DANIEL Stone, CNP   Nurse Practitioner  [facility] [place]  Pager: [phone]  Text Page  (8am - 5pm, M-F)    Patient Active Problem List   Diagnosis     NSTEMI (non-ST elevated myocardial infarction) (H)     Other social stressor     Physical Exam   Temp: 97.8  F (36.6  C) Temp src: Oral BP: 101/53 Pulse: 58   Resp: 20 SpO2: 95 % O2 Device: None (Room air) Oxygen Delivery: 3 LPM  Vitals:    01/16/23 0643 01/16/23 1719   Weight: 66.9 kg (147 lb 6.4 oz) 62.6 kg (138 lb 1.6 oz)     Vital Signs with Ranges  Temp:  [97.8  F (36.6  C)-99.4  F (37.4  C)] 97.8  F (36.6  C)  Pulse:  [58-79] 58  Resp:  [16-23] 20  BP: ()/(53-78) 101/53  SpO2:  [95 %-97 %] 95 %  No intake/output data recorded.    General: Appears her stated age, well nourished, and in no acute distress.  Eyes: No scleral icterus.  HEENT: Neck supple. No JVD.  Cardiovascular: Regular rate and rhythm, normal S1 and S2. No murmur, rub, or gallop. Left radial site soft, mildly tender, no erythema, ecchymosis, drainage or appreciable hematoma. Radial pulses full and equal bilaterally.  Extremities: Moves all extremities well and symmetrically. No LE edema.  Respiratory: Breathing non-labored. Lungs clear to auscultation with no crackles or wheezes bilaterally.  Skin: No pallor or cyanosis.  Psych: Appropriate affect. Mentation normal.  Neurological: No gross motor neurological focal deficits.    Medications       acetaminophen  1,000 mg Oral Q8H     carvedilol  3.125 mg Oral BID w/meals     enoxaparin ANTICOAGULANT  40 mg Subcutaneous Q24H     lisinopril  20 mg Oral Daily     sodium chloride (PF)  3 mL Intracatheter Q8H     Data   Recent Labs   Lab 01/17/23  0600 01/16/23  1351 01/16/23  0532   WBC 5.5  --  8.0   HGB 13.4  --  13.7   MCV 93  --  93     --  320   INR 1.05  --   --    NA  142 140 138   POTASSIUM 3.7 3.5 3.8   CHLORIDE 106 104 102   CO2 27 23 23   BUN 7.2 6.1 7.4   CR 0.50* 0.44* 0.42*   ANIONGAP 9 13 13   ANA 9.1 8.7 9.0   GLC 97 89 133*   ALBUMIN 3.9  --  4.4   PROTTOTAL 6.9  --  7.9   BILITOTAL 0.4  --  0.3   ALKPHOS 92  --  112*   ALT 22  --  34   AST 21  --  25   LIPASE  --   --  29     This note was completed in part using Dragon voice recognition software. Although reviewed after completion, some word and grammatical errors may occur.

## 2023-01-18 NOTE — PLAN OF CARE
Goal Outcome Evaluation:      Plan of Care Reviewed With: patient    Overall Patient Progress: improvingOverall Patient Progress: improving  Patient admitted with rt sided pain. Thought to maybe be Takotsubo/stress cardiomyopathy.  Angio today was done without need for intervention. On US found cholelithiasis.  Plan is out patient lap ximena and discharge tomorrow. Continue POC.

## 2023-01-20 ENCOUNTER — TELEPHONE (OUTPATIENT)
Dept: CARDIOLOGY | Facility: CLINIC | Age: 48
End: 2023-01-20

## 2023-01-20 ENCOUNTER — APPOINTMENT (OUTPATIENT)
Dept: INTERPRETER SERVICES | Facility: CLINIC | Age: 48
End: 2023-01-20

## 2023-01-20 NOTE — TELEPHONE ENCOUNTER
Patient was admitted to [facility] on 1/16/22 with CP, HTN with new lateral wall motion abnormality on echo. Patient notes she received a call Sunday that a family member had passed away so she has been grieving.     PMH: HTN    Echo showed EF of 45-50%. Left ventricular diastolic function is indeterminate. There is lateral wall dyskinesis.    1/17/23: Coronary angiogram via RRA showed Takot Subo CM with normal coronary arteries.    Pt was started on Lisinopril and Coreg. PTA Zestoretic was discontinued at time of discharge.    Called patient using  Services to discuss any post hospital d/c questions she may have, review medication changes, and confirm f/u appts. Patient denied any questions regarding new medications or changes with their PTA medications.    Patient denied any SOB, chest pain, fever or light headedness.     RRA cardiac cath site is without bleeding, swelling, redness or signs of infection.     RN confirmed with patient that she has an OV on 2/3/23 at 1225 with DENISHA Collin Villavicencio at our [facility]. Echo is scheduled on 3/8/23 at 1230 in [place]. DENISHA Villavicencio's Team RN phone number provided for any further questions.    Patient advised to call clinic with any cardiac related questions or concerns prior to this denisha't. Patient verbalized understanding and agreed with plan. SANAZ Collins RN.    Pt is scheduled for an OV on 2/3/23 at 1225 with DENISHA Collin Villavicencio at our [facility]. Echo is scheduled on 3/8/23 at 1230 in [place]. SANAZ Collins RN.

## 2023-01-24 NOTE — DISCHARGE SUMMARY
Physician Discharge Summary           [facility]    Name: Brit Sanchez    MRN: [id number]     YOB: 1975    Age: 47 year old                                                     Primary care provider: [facility]    Admit date:  1/16/2023    Discharge date and time: 1/18/2023  1:45 PM    Discharge Physician: Dewayne Ricardo M.D., M.B.A.       Primary Discharge Diagnosis        Severe right-sided chest pain    Elevated troponin    Takotsubo/stress cardiomyopathy     Secondary Diagnosis /chronic medical conditions     Past Medical History:   Diagnosis Date     Hypertension      Kidney stone      Past Surgical History:  Past Surgical History:   Procedure Laterality Date     CV CORONARY ANGIOGRAM N/A 1/17/2023    Procedure: Coronary Angiogram;  Surgeon: Sandro Denney MD;  Location: [facility] CARDIAC CATH LAB     HYSTEROSCOPY DIAGNOSTIC       Tubal ligation NOS             Brief Summary of Hospital stay :       Please refer to  Admission H&P note  and subsequent progress notes in EMR for full details of patient care.    Reason for Hospitalization(C/C,HPI and brief patient summary):chest pain       Significant findings(Primary diagnosis )Procedures and treatments provided(Hospital course ,consults, procedures):Please see below for details     Brit a 47-year-old female who presents with right-sided chest pain radiating to her right shoulder.  She had elevated troponin as well and started on a heparin drip due to concern for non-ST segment elevation MI.  Patient was closely monitored in the ER and treated with pain medications.  Cardiology team was consulted to assist with further recommendations.      Problem list (medical problems addressed during hospital stay):            1. Right-sided chest pain: Atypical chest pain for acute cardiac ischemia.  EKG showed no evidence of ischemic EKG pattern.   "Patient was empirically treated with heparin infusion in the emergency department.    Etiology of her chest pain is not entirely clear.    Cardiology was consulted and recommendation obtained     Patient was sent for stress testing with a treadmill stress echo.  Unfortunately patient developed severe pain and resting wall motion abnormality on echo limiting the study.    CAG done on 1/17 showed normal coronaries indication stress CMP and cardiology recommended medical management and follow echo in 4-6 weeks      pain resolved       2.  Elevated  troponin:       High-sensitivity troponin T was elevated at 51 on presentation.  Trended down to 42 after 4 hours.    We will get another troponin level in a few hours, monitor on telemetry, cardiology following.       3. Cholelithiasis       Evidence of cholelithiasis and ultrasonographic exam without evidence of acute cholecystitis.    May need outpatient lap ximena               Consultations during hospital stay:       PHARMACY IP CONSULT  PHARMACY IP CONSULT  CARDIOLOGY IP CONSULT  PHARMACY IP CONSULT  PHARMACY IP CONSULT  SURGERY GENERAL IP CONSULT  PHARMACY IP CONSULT  PHARMACY IP CONSULT  SMOKING CESSATION PROGRAM IP CONSULT      Patient discharge Condition:     stable    /53 (BP Location: Left arm)   Pulse 58   Temp 97.8  F (36.6  C) (Oral)   Resp 20   Ht 1.6 m (5' 3\")   Wt 62.6 kg (138 lb 1.6 oz)   SpO2 95%   BMI 24.46 kg/m         Discharge Instructions:       Patient/family instructions: Written discharge instruction given to patient/family    Discharge Medications:       Review of your medicines      START taking      Dose / Directions   carvedilol 3.125 MG tablet  Commonly known as: COREG  Used for: Stress-induced cardiomyopathy      Dose: 3.125 mg  Take 1 tablet (3.125 mg) by mouth 2 times daily (with meals)  Quantity: 60 tablet  Refills: 0     lisinopril 10 MG tablet  Commonly known as: ZESTRIL  Used for: Stress-induced cardiomyopathy      Dose: " 10 mg  Take 1 tablet (10 mg) by mouth daily  Quantity: 30 tablet  Refills: 0        CONTINUE these medicines which have NOT CHANGED      Dose / Directions   carboxymethylcellulose PF 1 % ophthalmic gel  Commonly known as: REFRESH LIQUIGEL  Indication: Excessive Cornea and Conjunctiva Dryness      Dose: 1 drop  Place 1 drop into both eyes 4 times daily  Refills: 0        STOP taking    lisinopril-hydrochlorothiazide 20-12.5 MG tablet  Commonly known as: ZESTORETIC              Where to get your medicines      These medications were sent to [facility] [address] [address] [facility]  [address] [facility] [address]    Phone: [phone]     carvedilol 3.125 MG tablet    lisinopril 10 MG tablet          Discharge diet:Orders Placed This Encounter      Diet    regular diet      Discharge activity:Activity as tolerated      Discharge follow-up:    Follow up with primary care provider in 7 days or earlier if symptoms return or gets worse.    Follow up with consultant as instructed  with  Cardiology       Other instructions:    We discussed with patient/family about detail discharge instructions as well as discharge medications above including potential risks,side effects and benefits.Patient/family understood benefits and potential serious side effects of taking these medications and need to follow up with PCP if the patient develops complications.  Patient is also advised to see a doctor immediately for severe symptoms.        Major procedure performed/  Significant Diagnostic Studies:       Results for orders placed or performed during the hospital encounter of 01/16/23   Chest XR,  PA & LAT    Narrative    EXAM: XR CHEST 2 VIEWS  LOCATION: [facility]  DATE/TIME: 1/16/2023 6:26 AM    INDICATION: chest pain  COMPARISON: Portable chest radiograph 04/29/2021      Impression    IMPRESSION: Negative chest.   CT Chest Pulmonary Embolism w Contrast    Narrative     CT CHEST PULMONARY EMBOLISM WITH CONTRAST 1/16/2023 8:02 AM    CLINICAL HISTORY: Chest pain.  Not pregnant.  No imaging to rule out  PE in the last 24 hours. Pulmonary Embolism Rule-Out Criteria (PERC)  score not > 0.    TECHNIQUE: CT angiogram chest during arterial phase injection IV  contrast. 2D and 3D MIP reconstructions were performed by the CT  technologist. Dose reduction techniques were used.     CONTRAST: 63 mL Isovue-370    COMPARISON: None.    FINDINGS:  ANGIOGRAM CHEST: Pulmonary arteries are normal caliber and negative  for pulmonary emboli. Thoracic aorta is negative for dissection. No CT  evidence of right heart strain.    LUNGS AND PLEURA: Question some groundglass infiltrates in both lower  lobes vs. motion artifact. No effusions.    MEDIASTINUM/AXILLAE: No adenopathy or aneurysm.    CORONARY ARTERY CALCIFICATIONS: None.    UPPER ABDOMEN: No acute findings.    MUSCULOSKELETAL: No frankly destructive bony lesions.      Impression    IMPRESSION:  1.  No pulmonary embolism demonstrated.  2.  Question some minimal groundglass infiltrates in both lower lobes  vs. motion artifact.    RENAE YATES MD         SYSTEM ID:  [id number]   US Abdomen Limited (RUQ)    Narrative    US ABDOMEN LIMITED 1/16/2023 8:59 AM    CLINICAL HISTORY: Abdominal pain.  TECHNIQUE: Limited abdominal ultrasound.    COMPARISON: None.    FINDINGS:    GALLBLADDER: Cholelithiasis without cholecystitis.    BILE DUCTS: There is no biliary dilatation. The common duct measures 3  mm.    LIVER: Mild increased echotexture likely related to mild fatty  infiltration, no focal lesions.    RIGHT KIDNEY: No hydronephrosis.    PANCREAS: The visualized portions of the pancreas are normal.    No ascites.      Impression    IMPRESSION: Cholelithiasis without cholecystitis.     RENAE YATES MD         SYSTEM ID:  [id number]   Echo Limited     Value    LVEF  45-50%    Narrative    [id number]  [id number]  [id number]  [id number]^IP^WHITNEY^MARYURI LAURA     [facility]  "[facility]  Echocardiography Laboratory  [address] [place] [address]     Name: BLANKA ROB  MRN: [id number]  : 1975  Study Date: 2023 01:13 PM  Age: 47 yrs  Gender: Female  Patient Location: [facility]  Reason For Study: Chest Pain  Ordering Physician: WHITNEY MOONEY  Performed By: Calista Latif     BSA: 1.6 m2  Height: 60 in  Weight: 147 lb  BP: 110/80 mmHg  ______________________________________________________________________________  Procedure  Limited Echo Adult. Optison (NDC #[id number]) given intravenously.  ______________________________________________________________________________  Interpretation Summary     A limited study was performed  There is lateral wall dyskinesis.  Left ventricular systolic function is mildly reduced.  The right ventricle is normal in structure, function and size.  Doppler interrogation does not demonstrate signficant stenosis or  insufficiency involving cardiac valves.     There is a focal anterolateral region of dyskinesis. The pattern of wall  motion abnormality is not typical for myocardial ischemia as the apex is  spared. The differential diagnosis includes \"Takotsubo/Stress\" cardiomyopathy  or lateral wall ischemic event from ramus/ diagonal coronary artery disease.  ______________________________________________________________________________  Left Ventricle  The left ventricle is normal in size. There is normal left ventricular wall  thickness. Left ventricular systolic function is mildly reduced. The visual  ejection fraction is 45-50%. Left ventricular diastolic function is  indeterminate. There is lateral wall dyskinesis. There is no thrombus seen in  the left ventricle.     Right Ventricle  The right ventricle is normal in structure, function and size. There is no  mass or thrombus in the right ventricle.     Atria  Normal left atrial size. The left atrial appendage is not well visualized.     Mitral Valve  The mitral valve " leaflets appear normal. There is no evidence of stenosis,  fluttering, or prolapse. There is no mitral regurgitation noted. There is no  mitral valve stenosis.     Tricuspid Valve  Normal tricuspid valve. No tricuspid regurgitation. There is no tricuspid  stenosis.     Aortic Valve  The aortic valve is trileaflet. No aortic regurgitation is present. No aortic  stenosis is present.     Pulmonic Valve  The pulmonic valve is not well seen, but is grossly normal. There is no  pulmonic valvular regurgitation. There is no pulmonic valvular stenosis.     Vessels  The aortic root is normal size. Normal size ascending aorta. Inferior vena  cava not well visualized for estimation of right atrial pressure.     Pericardium  The pericardium appears normal. There is no pleural effusion.     Rhythm  Sinus rhythm was noted.  ______________________________________________________________________________  MMode/2D Measurements & Calculations  asc Aorta Diam: 3.5 cm     ______________________________________________________________________________  Report approved by: Dr. Sandro Blackburn 01/16/2023 02:08 PM         Cardiac Catheterization    Narrative    1) Takotsubo Cardiomyopathy: presentation with NSTEMI with new lateral   wall regional wall motion abnormality, not in typical distribution of   epicardial coronaries.   2) HTN    Findings   Normal Coronaries RCA dominant           Pending Results:       Unresulted Labs Ordered in the Past 30 Days of this Admission     No orders found from 12/17/2022 to 1/17/2023.             Patient Allergies:       No Known Allergies      Disposition:     Disposition: home      I saw and evaluated the patient on day of discharge and  discharge instructions reviewed  and  all the patient's questions and concerns addressed. Over 30 minutes spent on discharge and coordination of discharge process for this patient.      Disclaimer: This note consists of symbols derived from keyboarding, dictation  and/or voice recognition software. As a result, there may be errors in the script that have gone undetected. Please consider this when interpreting information found in this chart

## 2023-02-03 ENCOUNTER — OFFICE VISIT (OUTPATIENT)
Dept: CARDIOLOGY | Facility: CLINIC | Age: 48
End: 2023-02-03
Attending: NURSE PRACTITIONER

## 2023-02-03 VITALS
HEIGHT: 63 IN | DIASTOLIC BLOOD PRESSURE: 70 MMHG | BODY MASS INDEX: 24.98 KG/M2 | WEIGHT: 141 LBS | OXYGEN SATURATION: 98 % | HEART RATE: 70 BPM | SYSTOLIC BLOOD PRESSURE: 124 MMHG

## 2023-02-03 DIAGNOSIS — Z65.9 OTHER SOCIAL STRESSOR: ICD-10-CM

## 2023-02-03 DIAGNOSIS — I51.9 LV DYSFUNCTION: ICD-10-CM

## 2023-02-03 DIAGNOSIS — I51.81 STRESS-INDUCED CARDIOMYOPATHY: ICD-10-CM

## 2023-02-03 DIAGNOSIS — I21.4 NSTEMI (NON-ST ELEVATED MYOCARDIAL INFARCTION) (H): ICD-10-CM

## 2023-02-03 PROCEDURE — 99213 OFFICE O/P EST LOW 20 MIN: CPT | Performed by: NURSE PRACTITIONER

## 2023-02-03 RX ORDER — LISINOPRIL 10 MG/1
10 TABLET ORAL DAILY
Qty: 90 TABLET | Refills: 3 | Status: SHIPPED | OUTPATIENT
Start: 2023-02-03 | End: 2023-03-14

## 2023-02-03 RX ORDER — CARVEDILOL 3.12 MG/1
3.12 TABLET ORAL 2 TIMES DAILY WITH MEALS
Qty: 180 TABLET | Refills: 3 | Status: SHIPPED | OUTPATIENT
Start: 2023-02-03 | End: 2023-03-14

## 2023-02-03 NOTE — PATIENT INSTRUCTIONS
Thank you for your visit with the [facility] today.    Today's plan:   Continue your current medications now.  Follow up with Dr. Garcia as planned next month with the echocardiogram beforehand.    If you have questions or concerns, please do not hesitate to call my nurse team at [phone].     Scheduling phone number: [phone]    It was a pleasure seeing you today!     DANIEL Stone, CNP  Nurse Practitioner  [facility]  February 3, 2023  ________________________________________________________

## 2023-02-03 NOTE — LETTER
2/3/2023    [facility]  [address] [address]    RE: Brit Sanchez       Dear Colleague,     I had the pleasure of seeing Brit Sanchez in the [facility].    Cardiology Clinic Progress Note    Service Date: February 3, 2023      Reason for Visit: Hospital follow up     HPI:   I had the pleasure of seeing Ms. Brit Sanchez in the clinic today. She is a delightful 47 year old female with a past medical history notable for hypertension and cholelithiasis. She was admitted to  on 1/16/2023 for pain under her right breast. Cardiology was consulted as her high sensitivity troponin level was elevated. Initial check was at 51 and trend was flat. Echocardiogram showed mildly reduced LV systolic function with EF 45-50% with a focal anterolateral region of dyskinesis which looked suspicious for Takotsubo/stress cardiomyopathy or lateral wall ischemic event from ramus/diagonal coronary artery disease. Coronary angiogram 1/17/23 showed normal coronaries.    The patient was previously on lisinopril-hydrochlorothiazide for management of her hypertension.  This was switched to lisinopril only at 20 mg once daily to allow room in her blood pressure to add low-dose beta-blocker with carvedilol 3.25 mg twice daily prior to discharge.    Today, Ms. Zenobia Sanchez presents to the clinic accompanied by her  in follow up of her recent hospitalization.  The visit today was completed with the help of a professional  connected via speaker phone.  The patient tells me she has been feeling great since she has been home in the hospital.  She denies any recurrent symptoms of chest pain.  She has not had shortness of breath, palpitations, dizziness, presyncope, or syncope.  No lower extremity edema.  Her blood pressure looks good in the clinic today at 124/70 and heart rate is stable at 70 bpm on  the low-dose of carvedilol.    ASSESSMENT AND PLAN:  1. Takotsubo/stress cardiomyopathy   - Presented with atypical right-sided chest pain with troponin elevation to 51 and flat in the setting of grieving the loss of a family member. EKG with no acute ischemic changes.   - Echocardiogram 1/16/23 showing mildly reduced LV systolic function with EF 45-50% and a focal anterolateral region of hypokinesis.  - Coronary angiogram 1/17/22 showing normal coronaries with RCA dominant system.   - Currently on lisinopril 10 mg daily and carvedilol 3.125 mg BID.   - Plan for a repeat echocardiogram in 1-2 months.     2. Hypertension     3. Cholelithiasis     Thank you for the opportunity to participate in this pleasant patient's care. I am happy to hear that she has been recovering well from her recent hospitalization. She is already scheduled to follow-up with Dr. Garcia in the clinic next month with a repeat echocardiogram beforehand for reassessment of her LV systolic function. We would be happy to see her sooner if needed for any concerns in the meantime.     20 total minutes was spent today including chart review, precharting, history and exam, post visit documentation, and reviewing studies as outlined above.     DANIEL Stone, CNP   Nurse Practitioner  [facility]  Pager: [phone]  Text Page  (8am - 5pm, M-F)    Orders this Visit:  No orders of the defined types were placed in this encounter.    Orders Placed This Encounter   Medications     carvedilol (COREG) 3.125 MG tablet     Sig: Take 1 tablet (3.125 mg) by mouth 2 times daily (with meals)     Dispense:  180 tablet     Refill:  3     lisinopril (ZESTRIL) 10 MG tablet     Sig: Take 1 tablet (10 mg) by mouth daily     Dispense:  90 tablet     Refill:  3     Medications Discontinued During This Encounter   Medication Reason     lisinopril (ZESTRIL) 10 MG tablet Reorder (No AVS / No eCancel)     carvedilol (COREG) 3.125 MG tablet Reorder (No AVS  / No eCancel)     Encounter Diagnoses   Name Primary?     NSTEMI (non-ST elevated myocardial infarction) (H)      Other social stressor      LV dysfunction      Stress-induced cardiomyopathy        CURRENT MEDICATIONS:  Current Outpatient Medications   Medication Sig Dispense Refill     carvedilol (COREG) 3.125 MG tablet Take 1 tablet (3.125 mg) by mouth 2 times daily (with meals) 180 tablet 3     lisinopril (ZESTRIL) 10 MG tablet Take 1 tablet (10 mg) by mouth daily 90 tablet 3     carboxymethylcellulose PF (REFRESH LIQUIGEL) 1 % ophthalmic gel Place 1 drop into both eyes 4 times daily (Patient not taking: Reported on 2/3/2023)         ALLERGIES  No Known Allergies    PAST MEDICAL, SURGICAL, FAMILY HISTORY:  History was reviewed and updated as needed, see medical record.    SOCIAL HISTORY:  Social History     Socioeconomic History     Marital status:      Spouse name: Not on file     Number of children: Not on file     Years of education: Not on file     Highest education level: Not on file   Occupational History     Not on file   Tobacco Use     Smoking status: Never     Smokeless tobacco: Never   Substance and Sexual Activity     Alcohol use: Not Currently     Drug use: Not Currently     Sexual activity: Not on file   Other Topics Concern     Not on file   Social History Narrative     Not on file     Social Determinants of Health     Financial Resource Strain: Not on file   Food Insecurity: Not on file   Transportation Needs: Not on file   Physical Activity: Not on file   Stress: Not on file   Social Connections: Not on file   Intimate Partner Violence: Not on file   Housing Stability: Not on file     Review of Systems:  Skin:        Eyes:       ENT:       Respiratory:  Negative    Cardiovascular:  Negative    Gastroenterology:      Genitourinary:       Musculoskeletal:       Neurologic:       Psychiatric:       Heme/Lymph/Imm:       Endocrine:          Physical Exam:  Vitals: /70 (BP Location: Right  "arm, Patient Position: Sitting, Cuff Size: Adult Regular)   Pulse 70   Ht 1.6 m (5' 3\")   Wt 64 kg (141 lb)   SpO2 98%   BMI 24.98 kg/m     Wt Readings from Last 4 Encounters:   02/03/23 64 kg (141 lb)   01/16/23 62.6 kg (138 lb 1.6 oz)   04/29/21 63.5 kg (140 lb)     CONSTITUTIONAL: Appears her stated age, well nourished, and in no acute distress.  HEENT: Pupils equal, round. No scleral icterus.  NECK: Supple, no JVD.  C/V:  Regular rate and rhythm, normal S1 and S2, no S3 or S4, no murmur, rub or gallop.   RESP: Respirations are unlabored. Lungs are clear to auscultation with no wheezes or crackles bilaterally.  EXTREM: There is no lower extremity edema.   NEURO: Alert and oriented, cooperative. No gross focal deficits.   PSYCH: Affect appropriate. Mentation normal.   SKIN: Warm and dry. No apparent rashes or bruising.    Recent Lab Results:  LIPID RESULTS:  No results found for: CHOL, HDL, LDL, TRIG, CHOLHDLRATIO  LIVER ENZYME RESULTS:  Lab Results   Component Value Date    AST 21 01/17/2023    ALT 22 01/17/2023     CBC RESULTS:  Lab Results   Component Value Date    WBC 5.5 01/17/2023    WBC 4.5 04/29/2021    RBC 4.42 01/17/2023    RBC 4.75 04/29/2021    HGB 13.4 01/17/2023    HGB 13.5 04/29/2021    HCT 41.3 01/17/2023    HCT 42.1 04/29/2021    MCV 93 01/17/2023    MCV 89 04/29/2021    MCH 30.3 01/17/2023    MCH 28.4 04/29/2021    MCHC 32.4 01/17/2023    MCHC 32.1 04/29/2021    RDW 13.2 01/17/2023    RDW 14.4 04/29/2021     01/17/2023     04/29/2021     BMP RESULTS:  Lab Results   Component Value Date     01/17/2023     04/29/2021    POTASSIUM 3.7 01/17/2023    POTASSIUM 3.1 (L) 04/29/2021    CHLORIDE 106 01/17/2023    CHLORIDE 109 04/29/2021    CO2 27 01/17/2023    CO2 24 04/29/2021    ANIONGAP 9 01/17/2023    ANIONGAP 7 04/29/2021    GLC 97 01/17/2023     (H) 04/29/2021    BUN 7.2 01/17/2023    BUN 5 (L) 04/29/2021    CR 0.50 (L) 01/17/2023    CR 0.58 04/29/2021    " GFRESTIMATED >90 01/17/2023    GFRESTIMATED >90 04/29/2021    GFRESTBLACK >90 04/29/2021    ANA 9.1 01/17/2023    ANA 8.9 04/29/2021      This note was completed in part using Dragon voice recognition software. Although reviewed after completion, some word and grammatical errors may occur.      Thank you for allowing me to participate in the care of your patient.      Sincerely,     Carlos Alberto Villavicencio NP     [facility]  cc:   Carlos Alberto Villavicencio NP  [phone] [id number]

## 2023-02-03 NOTE — PROGRESS NOTES
Cardiology Clinic Progress Note    Service Date: February 3, 2023      Reason for Visit: Hospital follow up     HPI:   I had the pleasure of seeing Ms. Brit Sanchez in the clinic today. She is a delightful 47 year old female with a past medical history notable for hypertension and cholelithiasis. She was admitted to [facility] on 1/16/2023 for pain under her right breast. Cardiology was consulted as her high sensitivity troponin level was elevated. Initial check was at 51 and trend was flat. Echocardiogram showed mildly reduced LV systolic function with EF 45-50% with a focal anterolateral region of dyskinesis which looked suspicious for Takotsubo/stress cardiomyopathy or lateral wall ischemic event from ramus/diagonal coronary artery disease. Coronary angiogram 1/17/23 showed normal coronaries.    The patient was previously on lisinopril-hydrochlorothiazide for management of her hypertension.  This was switched to lisinopril only at 20 mg once daily to allow room in her blood pressure to add low-dose beta-blocker with carvedilol 3.25 mg twice daily prior to discharge.    Today, Ms. Zenobia Sanchez presents to the clinic accompanied by her  in follow up of her recent hospitalization.  The visit today was completed with the help of a professional  connected via speaker phone.  The patient tells me she has been feeling great since she has been home in the hospital.  She denies any recurrent symptoms of chest pain.  She has not had shortness of breath, palpitations, dizziness, presyncope, or syncope.  No lower extremity edema.  Her blood pressure looks good in the clinic today at 124/70 and heart rate is stable at 70 bpm on the low-dose of carvedilol.    ASSESSMENT AND PLAN:  1. Takotsubo/stress cardiomyopathy   - Presented with atypical right-sided chest pain with troponin elevation to 51 and flat in the setting of grieving the loss of a family member. EKG  with no acute ischemic changes.   - Echocardiogram 1/16/23 showing mildly reduced LV systolic function with EF 45-50% and a focal anterolateral region of hypokinesis.  - Coronary angiogram 1/17/22 showing normal coronaries with RCA dominant system.   - Currently on lisinopril 10 mg daily and carvedilol 3.125 mg BID.   - Plan for a repeat echocardiogram in 1-2 months.     2. Hypertension     3. Cholelithiasis     Thank you for the opportunity to participate in this pleasant patient's care. I am happy to hear that she has been recovering well from her recent hospitalization. She is already scheduled to follow-up with Dr. Garcia in the clinic next month with a repeat echocardiogram beforehand for reassessment of her LV systolic function. We would be happy to see her sooner if needed for any concerns in the meantime.     20 total minutes was spent today including chart review, precharting, history and exam, post visit documentation, and reviewing studies as outlined above.     DANIEL Stone, CNP   Nurse Practitioner  [facility]  Pager: [phone]  Text Page  (8am - 5pm, M-F)    Orders this Visit:  No orders of the defined types were placed in this encounter.    Orders Placed This Encounter   Medications     carvedilol (COREG) 3.125 MG tablet     Sig: Take 1 tablet (3.125 mg) by mouth 2 times daily (with meals)     Dispense:  180 tablet     Refill:  3     lisinopril (ZESTRIL) 10 MG tablet     Sig: Take 1 tablet (10 mg) by mouth daily     Dispense:  90 tablet     Refill:  3     Medications Discontinued During This Encounter   Medication Reason     lisinopril (ZESTRIL) 10 MG tablet Reorder (No AVS / No eCancel)     carvedilol (COREG) 3.125 MG tablet Reorder (No AVS / No eCancel)     Encounter Diagnoses   Name Primary?     NSTEMI (non-ST elevated myocardial infarction) (H)      Other social stressor      LV dysfunction      Stress-induced cardiomyopathy        CURRENT MEDICATIONS:  Current Outpatient  "Medications   Medication Sig Dispense Refill     carvedilol (COREG) 3.125 MG tablet Take 1 tablet (3.125 mg) by mouth 2 times daily (with meals) 180 tablet 3     lisinopril (ZESTRIL) 10 MG tablet Take 1 tablet (10 mg) by mouth daily 90 tablet 3     carboxymethylcellulose PF (REFRESH LIQUIGEL) 1 % ophthalmic gel Place 1 drop into both eyes 4 times daily (Patient not taking: Reported on 2/3/2023)         ALLERGIES  No Known Allergies    PAST MEDICAL, SURGICAL, FAMILY HISTORY:  History was reviewed and updated as needed, see medical record.    SOCIAL HISTORY:  Social History     Socioeconomic History     Marital status:      Spouse name: Not on file     Number of children: Not on file     Years of education: Not on file     Highest education level: Not on file   Occupational History     Not on file   Tobacco Use     Smoking status: Never     Smokeless tobacco: Never   Substance and Sexual Activity     Alcohol use: Not Currently     Drug use: Not Currently     Sexual activity: Not on file   Other Topics Concern     Not on file   Social History Narrative     Not on file     Social Determinants of Health     Financial Resource Strain: Not on file   Food Insecurity: Not on file   Transportation Needs: Not on file   Physical Activity: Not on file   Stress: Not on file   Social Connections: Not on file   Intimate Partner Violence: Not on file   Housing Stability: Not on file     Review of Systems:  Skin:        Eyes:       ENT:       Respiratory:  Negative    Cardiovascular:  Negative    Gastroenterology:      Genitourinary:       Musculoskeletal:       Neurologic:       Psychiatric:       Heme/Lymph/Imm:       Endocrine:          Physical Exam:  Vitals: /70 (BP Location: Right arm, Patient Position: Sitting, Cuff Size: Adult Regular)   Pulse 70   Ht 1.6 m (5' 3\")   Wt 64 kg (141 lb)   SpO2 98%   BMI 24.98 kg/m     Wt Readings from Last 4 Encounters:   02/03/23 64 kg (141 lb)   01/16/23 62.6 kg (138 lb 1.6 " oz)   04/29/21 63.5 kg (140 lb)     CONSTITUTIONAL: Appears her stated age, well nourished, and in no acute distress.  HEENT: Pupils equal, round. No scleral icterus.  NECK: Supple, no JVD.  C/V:  Regular rate and rhythm, normal S1 and S2, no S3 or S4, no murmur, rub or gallop.   RESP: Respirations are unlabored. Lungs are clear to auscultation with no wheezes or crackles bilaterally.  EXTREM: There is no lower extremity edema.   NEURO: Alert and oriented, cooperative. No gross focal deficits.   PSYCH: Affect appropriate. Mentation normal.   SKIN: Warm and dry. No apparent rashes or bruising.    Recent Lab Results:  LIPID RESULTS:  No results found for: CHOL, HDL, LDL, TRIG, CHOLHDLRATIO  LIVER ENZYME RESULTS:  Lab Results   Component Value Date    AST 21 01/17/2023    ALT 22 01/17/2023     CBC RESULTS:  Lab Results   Component Value Date    WBC 5.5 01/17/2023    WBC 4.5 04/29/2021    RBC 4.42 01/17/2023    RBC 4.75 04/29/2021    HGB 13.4 01/17/2023    HGB 13.5 04/29/2021    HCT 41.3 01/17/2023    HCT 42.1 04/29/2021    MCV 93 01/17/2023    MCV 89 04/29/2021    MCH 30.3 01/17/2023    MCH 28.4 04/29/2021    MCHC 32.4 01/17/2023    MCHC 32.1 04/29/2021    RDW 13.2 01/17/2023    RDW 14.4 04/29/2021     01/17/2023     04/29/2021     BMP RESULTS:  Lab Results   Component Value Date     01/17/2023     04/29/2021    POTASSIUM 3.7 01/17/2023    POTASSIUM 3.1 (L) 04/29/2021    CHLORIDE 106 01/17/2023    CHLORIDE 109 04/29/2021    CO2 27 01/17/2023    CO2 24 04/29/2021    ANIONGAP 9 01/17/2023    ANIONGAP 7 04/29/2021    GLC 97 01/17/2023     (H) 04/29/2021    BUN 7.2 01/17/2023    BUN 5 (L) 04/29/2021    CR 0.50 (L) 01/17/2023    CR 0.58 04/29/2021    GFRESTIMATED >90 01/17/2023    GFRESTIMATED >90 04/29/2021    GFRESTBLACK >90 04/29/2021    ANA 9.1 01/17/2023    ANA 8.9 04/29/2021      This note was completed in part using Dragon voice recognition software. Although reviewed after  completion, some word and grammatical errors may occur.

## 2023-03-08 ENCOUNTER — HOSPITAL ENCOUNTER (OUTPATIENT)
Dept: CARDIOLOGY | Facility: CLINIC | Age: 48
Discharge: HOME | End: 2023-03-08
Attending: NURSE PRACTITIONER | Admitting: NURSE PRACTITIONER

## 2023-03-08 DIAGNOSIS — I51.81 STRESS-INDUCED CARDIOMYOPATHY: ICD-10-CM

## 2023-03-08 LAB — LVEF ECHO: NORMAL

## 2023-03-08 PROCEDURE — 93306 TTE W/DOPPLER COMPLETE: CPT

## 2023-03-08 PROCEDURE — 93306 TTE W/DOPPLER COMPLETE: CPT | Mod: 26 | Performed by: INTERNAL MEDICINE

## 2023-03-13 ENCOUNTER — APPOINTMENT (OUTPATIENT)
Dept: INTERPRETER SERVICES | Facility: CLINIC | Age: 48
End: 2023-03-13

## 2023-03-14 ENCOUNTER — OFFICE VISIT (OUTPATIENT)
Dept: CARDIOLOGY | Facility: CLINIC | Age: 48
End: 2023-03-14
Attending: NURSE PRACTITIONER

## 2023-03-14 VITALS
HEART RATE: 74 BPM | SYSTOLIC BLOOD PRESSURE: 128 MMHG | HEIGHT: 63 IN | WEIGHT: 143 LBS | DIASTOLIC BLOOD PRESSURE: 72 MMHG | BODY MASS INDEX: 25.34 KG/M2 | OXYGEN SATURATION: 98 %

## 2023-03-14 DIAGNOSIS — I51.81 STRESS-INDUCED CARDIOMYOPATHY: ICD-10-CM

## 2023-03-14 PROCEDURE — 99214 OFFICE O/P EST MOD 30 MIN: CPT | Performed by: INTERNAL MEDICINE

## 2023-03-14 RX ORDER — CARVEDILOL 3.12 MG/1
3.12 TABLET ORAL 2 TIMES DAILY WITH MEALS
Qty: 180 TABLET | Refills: 4 | Status: SHIPPED | OUTPATIENT
Start: 2023-03-14

## 2023-03-14 RX ORDER — LISINOPRIL 10 MG/1
10 TABLET ORAL DAILY
Qty: 90 TABLET | Refills: 3 | Status: SHIPPED | OUTPATIENT
Start: 2023-03-14

## 2023-03-14 NOTE — PROGRESS NOTES
HPI and Plan:   Ms Zenobia Sanchez is a very pleasant German-speaking female who was admitted in January 2023 with chest discomfort in the setting of significant stress due to a death in the family which happened in [place].  Coronary angiogram showed lateral wall dyskinesia with LVEF of 45 to 50%.  She underwent coronary angiogram that showed normal coronary arteries.  It was felt to be stress induced cardiomyopathy.  She was also found to have hypertension.  She was started on carvedilol lisinopril.  In the follow-up she was noted to be doing quite well.  Today she is coming for routine follow-up.  I am seeing the patient for the first time.  In the hospital patient was seen by Dr. Carpio.  An official  is used for this review.  Patient had a repeat echocardiogram that showed normal LV function with normal wall motion.  There was some concern about possibility of narrowing in the aortic arch and descending thoracic aorta.  Patient had a CT chest at the time of ER visit and I reviewed those images personally the aortic arch and descending thoracic aorta appears normal caliber.  Overall patient is doing well.  Blood pressures well controlled on carvedilol and lisinopril.  She does not use any tobacco.    Assessment plan  1.  Stress cardiomyopathy.  LV function is now normalized with resolution of wall motion abnormalities.  Clinically no symptoms.  2.  Hypertension well-controlled on carvedilol and lisinopril.  Recommend continuing same.    Recommendations  Follow-up in a year    Today's clinic visit entailed:      Orders Placed This Encounter   Procedures     Follow-Up with Cardiology       Orders Placed This Encounter   Medications     carvedilol (COREG) 3.125 MG tablet     Sig: Take 1 tablet (3.125 mg) by mouth 2 times daily (with meals)     Dispense:  180 tablet     Refill:  4     lisinopril (ZESTRIL) 10 MG tablet     Sig: Take 1 tablet (10 mg) by mouth daily     Dispense:  90 tablet     Refill:  3  "      Medications Discontinued During This Encounter   Medication Reason     carboxymethylcellulose PF (REFRESH LIQUIGEL) 1 % ophthalmic gel      carvedilol (COREG) 3.125 MG tablet Reorder (No AVS / No eCancel)     lisinopril (ZESTRIL) 10 MG tablet Reorder (No AVS / No eCancel)         Encounter Diagnosis   Name Primary?     Stress-induced cardiomyopathy        CURRENT MEDICATIONS:  Current Outpatient Medications   Medication Sig Dispense Refill     carvedilol (COREG) 3.125 MG tablet Take 1 tablet (3.125 mg) by mouth 2 times daily (with meals) 180 tablet 4     lisinopril (ZESTRIL) 10 MG tablet Take 1 tablet (10 mg) by mouth daily 90 tablet 3       ALLERGIES   No Known Allergies    PAST MEDICAL HISTORY:  Past Medical History:   Diagnosis Date     Hypertension      Kidney stone        PAST SURGICAL HISTORY:  Past Surgical History:   Procedure Laterality Date     CV CORONARY ANGIOGRAM N/A 1/17/2023    Procedure: Coronary Angiogram;  Surgeon: Sandro Denney MD;  Location:  [facility]     HYSTEROSCOPY DIAGNOSTIC       Tubal ligation NOS         FAMILY HISTORY:  No family history on file.    SOCIAL HISTORY:  Social History     Socioeconomic History     Marital status:    Tobacco Use     Smoking status: Never     Smokeless tobacco: Never   Substance and Sexual Activity     Alcohol use: Not Currently     Drug use: Not Currently       Review of Systems:  Skin:          Eyes:         ENT:         Respiratory:  Negative       Cardiovascular:  Negative      Gastroenterology:        Genitourinary:         Musculoskeletal:         Neurologic:         Psychiatric:         Heme/Lymph/Imm:         Endocrine:           Physical Exam:  Vitals: /72 (BP Location: Right arm, Patient Position: Sitting, Cuff Size: Adult Regular)   Pulse 74   Ht 1.6 m (5' 3\")   Wt 64.9 kg (143 lb)   SpO2 98%   BMI 25.33 kg/m      General patient appears comfortable  Neck normal JVP  Cardiovascular system S1-S2 normal " no murmur rub or gallop  Respiratory system clear to auscultation  Extremities no pitting pedal edema, right radial site without any hematoma or bruise, distal perfusion intact    CC  Carlos Alberto Villavicencio, NP  [phone] [place] [id number]

## 2023-03-14 NOTE — LETTER
3/14/2023    [facility]  [address] [address]    RE: Brit Sanchez       Dear Colleague,     I had the pleasure of seeing Brit Sanchez in the [facility].  HPI and Plan:   Ms Zenobia Sanchez is a very pleasant [place]ut-speaking female who was admitted in January 2023 with chest discomfort in the setting of significant stress due to a death in the family which happened in [place].  Coronary angiogram showed lateral wall dyskinesia with LVEF of 45 to 50%.  She underwent coronary angiogram that showed normal coronary arteries.  It was felt to be stress induced cardiomyopathy.  She was also found to have hypertension.  She was started on carvedilol lisinopril.  In the follow-up she was noted to be doing quite well.  Today she is coming for routine follow-up.  I am seeing the patient for the first time.  In the hospital patient was seen by Dr. Carpio.  An official  is used for this review.  Patient had a repeat echocardiogram that showed normal LV function with normal wall motion.  There was some concern about possibility of narrowing in the aortic arch and descending thoracic aorta.  Patient had a CT chest at the time of ER visit and I reviewed those images personally the aortic arch and descending thoracic aorta appears normal caliber.  Overall patient is doing well.  Blood pressures well controlled on carvedilol and lisinopril.  She does not use any tobacco.    Assessment plan  1.  Stress cardiomyopathy.  LV function is now normalized with resolution of wall motion abnormalities.  Clinically no symptoms.  2.  Hypertension well-controlled on carvedilol and lisinopril.  Recommend continuing same.    Recommendations  Follow-up in a year    Today's clinic visit entailed:      Orders Placed This Encounter   Procedures     Follow-Up with Cardiology       Orders Placed This Encounter   Medications     carvedilol (COREG) 3.125 MG  tablet     Sig: Take 1 tablet (3.125 mg) by mouth 2 times daily (with meals)     Dispense:  180 tablet     Refill:  4     lisinopril (ZESTRIL) 10 MG tablet     Sig: Take 1 tablet (10 mg) by mouth daily     Dispense:  90 tablet     Refill:  3       Medications Discontinued During This Encounter   Medication Reason     carboxymethylcellulose PF (REFRESH LIQUIGEL) 1 % ophthalmic gel      carvedilol (COREG) 3.125 MG tablet Reorder (No AVS / No eCancel)     lisinopril (ZESTRIL) 10 MG tablet Reorder (No AVS / No eCancel)         Encounter Diagnosis   Name Primary?     Stress-induced cardiomyopathy        CURRENT MEDICATIONS:  Current Outpatient Medications   Medication Sig Dispense Refill     carvedilol (COREG) 3.125 MG tablet Take 1 tablet (3.125 mg) by mouth 2 times daily (with meals) 180 tablet 4     lisinopril (ZESTRIL) 10 MG tablet Take 1 tablet (10 mg) by mouth daily 90 tablet 3       ALLERGIES   No Known Allergies    PAST MEDICAL HISTORY:  Past Medical History:   Diagnosis Date     Hypertension      Kidney stone        PAST SURGICAL HISTORY:  Past Surgical History:   Procedure Laterality Date     CV CORONARY ANGIOGRAM N/A 1/17/2023    Procedure: Coronary Angiogram;  Surgeon: Sandro Denney MD;  Location:  [facility]     HYSTEROSCOPY DIAGNOSTIC       Tubal ligation NOS         FAMILY HISTORY:  No family history on file.    SOCIAL HISTORY:  Social History     Socioeconomic History     Marital status:    Tobacco Use     Smoking status: Never     Smokeless tobacco: Never   Substance and Sexual Activity     Alcohol use: Not Currently     Drug use: Not Currently       Review of Systems:  Skin:          Eyes:         ENT:         Respiratory:  Negative       Cardiovascular:  Negative      Gastroenterology:        Genitourinary:         Musculoskeletal:         Neurologic:         Psychiatric:         Heme/Lymph/Imm:         Endocrine:           Physical Exam:  Vitals: /72 (BP Location:  "Right arm, Patient Position: Sitting, Cuff Size: Adult Regular)   Pulse 74   Ht 1.6 m (5' 3\")   Wt 64.9 kg (143 lb)   SpO2 98%   BMI 25.33 kg/m      General patient appears comfortable  Neck normal JVP  Cardiovascular system S1-S2 normal no murmur rub or gallop  Respiratory system clear to auscultation  Extremities no pitting pedal edema, right radial site without any hematoma or bruise, distal perfusion intact    CC  Carlos Alberto Villavicencio, NP  [address] [place] [id number]            Thank you for allowing me to participate in the care of your patient.      Sincerely,     Darrel Garcia MD     [facility]  "

## (undated) DEVICE — WIRE GUIDE 0.035"X260CM SAFE-T-J EXCHANGE G00517

## (undated) DEVICE — INTRO GLIDESHEATH SLENDER 6FR 10X45CM 60-1060

## (undated) DEVICE — CATH JACKY 5FR 3.5 CURVE 40-5023

## (undated) DEVICE — DEFIB PRO-PADZ LVP LQD GEL ADULT 8900-2105-01

## (undated) DEVICE — MANIFOLD KIT ANGIO AUTOMATED 014613

## (undated) DEVICE — Device

## (undated) DEVICE — KIT HAND CONTROL ANGIOTOUCH ACIST 65CM AT-P65

## (undated) DEVICE — SLEEVE TR BAND RADIAL COMPRESSION DEVICE 24CM TRB24-REG

## (undated) DEVICE — CATH ANGIO INFINITI JL3.5 4FRX100CM 538418